# Patient Record
Sex: MALE | Race: BLACK OR AFRICAN AMERICAN | Employment: UNEMPLOYED | URBAN - METROPOLITAN AREA
[De-identification: names, ages, dates, MRNs, and addresses within clinical notes are randomized per-mention and may not be internally consistent; named-entity substitution may affect disease eponyms.]

---

## 2019-12-14 ENCOUNTER — APPOINTMENT (OUTPATIENT)
Dept: GENERAL RADIOLOGY | Facility: HOSPITAL | Age: 44
End: 2019-12-14
Attending: EMERGENCY MEDICINE
Payer: MEDICAID

## 2019-12-14 ENCOUNTER — HOSPITAL ENCOUNTER (EMERGENCY)
Facility: HOSPITAL | Age: 44
Discharge: HOME | End: 2019-12-14
Attending: EMERGENCY MEDICINE
Payer: MEDICAID

## 2019-12-14 VITALS
SYSTOLIC BLOOD PRESSURE: 141 MMHG | RESPIRATION RATE: 16 BRPM | HEART RATE: 75 BPM | OXYGEN SATURATION: 96 % | DIASTOLIC BLOOD PRESSURE: 85 MMHG | WEIGHT: 175 LBS | TEMPERATURE: 98 F

## 2019-12-14 DIAGNOSIS — R07.89 CHEST TIGHTNESS: ICD-10-CM

## 2019-12-14 DIAGNOSIS — S39.012A BACK STRAIN, INITIAL ENCOUNTER: Primary | ICD-10-CM

## 2019-12-14 PROCEDURE — 85025 COMPLETE CBC W/AUTO DIFF WBC: CPT | Performed by: EMERGENCY MEDICINE

## 2019-12-14 PROCEDURE — 80048 BASIC METABOLIC PNL TOTAL CA: CPT

## 2019-12-14 PROCEDURE — 71045 X-RAY EXAM CHEST 1 VIEW: CPT | Performed by: EMERGENCY MEDICINE

## 2019-12-14 PROCEDURE — 81003 URINALYSIS AUTO W/O SCOPE: CPT | Performed by: EMERGENCY MEDICINE

## 2019-12-14 PROCEDURE — 99284 EMERGENCY DEPT VISIT MOD MDM: CPT

## 2019-12-14 PROCEDURE — 96374 THER/PROPH/DIAG INJ IV PUSH: CPT

## 2019-12-14 PROCEDURE — 93010 ELECTROCARDIOGRAM REPORT: CPT | Performed by: EMERGENCY MEDICINE

## 2019-12-14 PROCEDURE — 81003 URINALYSIS AUTO W/O SCOPE: CPT

## 2019-12-14 PROCEDURE — 93005 ELECTROCARDIOGRAM TRACING: CPT

## 2019-12-14 PROCEDURE — 80048 BASIC METABOLIC PNL TOTAL CA: CPT | Performed by: EMERGENCY MEDICINE

## 2019-12-14 PROCEDURE — 85025 COMPLETE CBC W/AUTO DIFF WBC: CPT

## 2019-12-14 RX ORDER — KETOROLAC TROMETHAMINE 30 MG/ML
30 INJECTION, SOLUTION INTRAMUSCULAR; INTRAVENOUS ONCE
Status: COMPLETED | OUTPATIENT
Start: 2019-12-14 | End: 2019-12-14

## 2019-12-14 RX ORDER — ENALAPRIL MALEATE 10 MG/1
10 TABLET ORAL DAILY
COMMUNITY

## 2019-12-14 NOTE — ED INITIAL ASSESSMENT (HPI)
Pt c/o right flank pain since last night. Denies injury, denies n/v. He states that pain is worse with movement and taking deep breaths. Pt adds that he is also having CP.

## 2019-12-15 NOTE — ED PROVIDER NOTES
Patient Seen in: [facility] AND [facility] Emergency Department    History   Patient presents with:  Abdomen/Flank Pain  Chest Pain Angina      HPI    Patient presents to the ED complaining of pain to his right lower back that started several days ago but worsen Pulmonary/Chest: Effort normal. No stridor. No respiratory distress. He exhibits tenderness. Mild bilateral sternal tenderness, palpation reproduces symptoms. Abdominal: Soft. He exhibits no distension.  Musculoskeletal:         General: Tenderness pr Medications   ketorolac tromethamine (TORADOL) 30 MG/ML injection 30 mg (30 mg Intravenous Given 12/14/19 1949)         MDM      12/14/19  1741 12/14/19  1845 12/14/19 2034   BP: 136/65 132/76 141/85   Pulse: 83 80 75   Resp: 22 20 16   Temp: 98 °F (36.

## 2019-12-15 NOTE — ED NOTES
Pt having right flank pain. Denies dysuria. Denies history of kidney stones. States last night he had a hard time breathing. Lungs clear.

## 2019-12-15 NOTE — ED NOTES
Assumed care. Currently denies flank pain or sob, but states pain waxes and wanes. Updated on POC. Denies needs at this time.

## 2022-11-24 ENCOUNTER — HOSPITAL ENCOUNTER (EMERGENCY)
Facility: HOSPITAL | Age: 47
Discharge: HOME | End: 2022-11-24
Attending: EMERGENCY MEDICINE
Payer: MEDICAID

## 2022-11-24 ENCOUNTER — APPOINTMENT (OUTPATIENT)
Dept: GENERAL RADIOLOGY | Facility: HOSPITAL | Age: 47
End: 2022-11-24
Attending: EMERGENCY MEDICINE
Payer: MEDICAID

## 2022-11-24 VITALS
HEART RATE: 91 BPM | WEIGHT: 192 LBS | SYSTOLIC BLOOD PRESSURE: 154 MMHG | DIASTOLIC BLOOD PRESSURE: 88 MMHG | BODY MASS INDEX: 32.78 KG/M2 | OXYGEN SATURATION: 98 % | RESPIRATION RATE: 17 BRPM | TEMPERATURE: 98 F | HEIGHT: 64 IN

## 2022-11-24 DIAGNOSIS — N48.5 PENILE ULCER: ICD-10-CM
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
## 2022-11-24 DIAGNOSIS — S79.922A INJURY OF LEFT THIGH, INITIAL ENCOUNTER: ICD-10-CM

## 2022-11-24 PROCEDURE — 87491 CHLMYD TRACH DNA AMP PROBE: CPT | Performed by: EMERGENCY MEDICINE

## 2022-11-24 PROCEDURE — 87591 N.GONORRHOEAE DNA AMP PROB: CPT | Performed by: EMERGENCY MEDICINE

## 2022-11-24 PROCEDURE — 99284 EMERGENCY DEPT VISIT MOD MDM: CPT

## 2022-11-24 PROCEDURE — 96372 THER/PROPH/DIAG INJ SC/IM: CPT

## 2022-11-24 PROCEDURE — 90471 IMMUNIZATION ADMIN: CPT

## 2022-11-24 PROCEDURE — 73552 X-RAY EXAM OF FEMUR 2/>: CPT | Performed by: EMERGENCY MEDICINE

## 2022-11-24 RX ORDER — CYCLOBENZAPRINE HCL 10 MG
10 TABLET ORAL 3 TIMES DAILY PRN
Qty: 20 TABLET | Refills: 0 | Status: SHIPPED | OUTPATIENT
Start: 2022-11-24 | End: 2022-12-01

## 2022-11-24 RX ORDER — ACYCLOVIR 800 MG/1
400 TABLET ORAL 3 TIMES DAILY
Qty: 15 TABLET | Refills: 0 | Status: SHIPPED | OUTPATIENT
Start: 2022-11-24 | End: 2022-12-04

## 2022-11-24 RX ORDER — CYCLOBENZAPRINE HCL 10 MG
10 TABLET ORAL ONCE
Status: COMPLETED | OUTPATIENT
Start: 2022-11-24 | End: 2022-11-24

## 2022-11-24 RX ORDER — ACYCLOVIR 400 MG/1
400 TABLET ORAL ONCE
Status: COMPLETED | OUTPATIENT
Start: 2022-11-24 | End: 2022-11-24

## 2022-11-24 RX ORDER — AZITHROMYCIN 250 MG/1
1000 TABLET, FILM COATED ORAL ONCE
Status: COMPLETED | OUTPATIENT
Start: 2022-11-24 | End: 2022-11-24
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
## 2022-11-25 LAB
C TRACH DNA SPEC QL NAA+PROBE: NEGATIVE
N GONORRHOEA DNA SPEC QL NAA+PROBE: NEGATIVE

## 2023-03-15 ENCOUNTER — APPOINTMENT (OUTPATIENT)
Dept: GENERAL RADIOLOGY | Facility: HOSPITAL | Age: 48
End: 2023-03-15
Payer: MEDICAID

## 2023-03-15 ENCOUNTER — HOSPITAL ENCOUNTER (EMERGENCY)
Facility: HOSPITAL | Age: 48
Discharge: HOME | End: 2023-03-16
Attending: EMERGENCY MEDICINE
Payer: MEDICAID

## 2023-03-15 VITALS
TEMPERATURE: 99 F | RESPIRATION RATE: 18 BRPM | DIASTOLIC BLOOD PRESSURE: 98 MMHG | SYSTOLIC BLOOD PRESSURE: 163 MMHG | HEART RATE: 94 BPM | OXYGEN SATURATION: 94 %

## 2023-03-15 DIAGNOSIS — R05.2 SUBACUTE COUGH: Primary | ICD-10-CM

## 2023-03-15 LAB
ANION GAP SERPL CALC-SCNC: 11 MMOL/L (ref 0–18)
BASOPHILS # BLD AUTO: 0.02 X10(3) UL (ref 0–0.2)
BASOPHILS NFR BLD AUTO: 0.3 %
BUN BLD-MCNC: 16 MG/DL (ref 7–18)
BUN/CREAT SERPL: 6.2 (ref 10–20)
CALCIUM BLD-MCNC: 8.7 MG/DL (ref 8.5–10.1)
CHLORIDE SERPL-SCNC: 107 MMOL/L (ref 98–112)
CO2 SERPL-SCNC: 25 MMOL/L (ref 21–32)
CREAT BLD-MCNC: 2.57 MG/DL
DEPRECATED RDW RBC AUTO: 40.6 FL (ref 35.1–46.3)
EOSINOPHIL # BLD AUTO: 0.05 X10(3) UL (ref 0–0.7)
EOSINOPHIL NFR BLD AUTO: 0.8 %
ERYTHROCYTE [DISTWIDTH] IN BLOOD BY AUTOMATED COUNT: 11.3 % (ref 11–15)
FLUAV + FLUBV RNA SPEC NAA+PROBE: NEGATIVE
FLUAV + FLUBV RNA SPEC NAA+PROBE: NEGATIVE
GFR SERPLBLD BASED ON 1.73 SQ M-ARVRAT: 30 ML/MIN/1.73M2 (ref 60–?)
GLUCOSE BLD-MCNC: 102 MG/DL (ref 70–99)
HCT VFR BLD AUTO: 34.6 %
HGB BLD-MCNC: 12.5 G/DL
IMM GRANULOCYTES # BLD AUTO: 0.01 X10(3) UL (ref 0–1)
IMM GRANULOCYTES NFR BLD: 0.2 %
LYMPHOCYTES # BLD AUTO: 1.28 X10(3) UL (ref 1–4)
LYMPHOCYTES NFR BLD AUTO: 21.1 %
MCH RBC QN AUTO: 35.2 PG (ref 26–34)
MCHC RBC AUTO-ENTMCNC: 36.1 G/DL (ref 31–37)
MCV RBC AUTO: 97.5 FL
MONOCYTES # BLD AUTO: 0.46 X10(3) UL (ref 0.1–1)
MONOCYTES NFR BLD AUTO: 7.6 %
NEUTROPHILS # BLD AUTO: 4.26 X10 (3) UL (ref 1.5–7.7)
NEUTROPHILS # BLD AUTO: 4.26 X10(3) UL (ref 1.5–7.7)
NEUTROPHILS NFR BLD AUTO: 70 %
OSMOLALITY SERPL CALC.SUM OF ELEC: 297 MOSM/KG (ref 275–295)
PLATELET # BLD AUTO: 219 10(3)UL (ref 150–450)
POTASSIUM SERPL-SCNC: 3 MMOL/L (ref 3.5–5.1)
RBC # BLD AUTO: 3.55 X10(6)UL
RSV RNA SPEC NAA+PROBE: NEGATIVE
SARS-COV-2 RNA RESP QL NAA+PROBE: NOT DETECTED
SODIUM SERPL-SCNC: 143 MMOL/L (ref 136–145)
TROPONIN I HIGH SENSITIVITY: 10 NG/L
WBC # BLD AUTO: 6.1 X10(3) UL (ref 4–11)

## 2023-03-15 PROCEDURE — 85025 COMPLETE CBC W/AUTO DIFF WBC: CPT | Performed by: EMERGENCY MEDICINE

## 2023-03-15 PROCEDURE — 36415 COLL VENOUS BLD VENIPUNCTURE: CPT

## 2023-03-15 PROCEDURE — 80048 BASIC METABOLIC PNL TOTAL CA: CPT | Performed by: EMERGENCY MEDICINE

## 2023-03-15 PROCEDURE — 71045 X-RAY EXAM CHEST 1 VIEW: CPT

## 2023-03-15 PROCEDURE — 99285 EMERGENCY DEPT VISIT HI MDM: CPT

## 2023-03-15 PROCEDURE — 93010 ELECTROCARDIOGRAM REPORT: CPT

## 2023-03-15 PROCEDURE — 80048 BASIC METABOLIC PNL TOTAL CA: CPT

## 2023-03-15 PROCEDURE — 85025 COMPLETE CBC W/AUTO DIFF WBC: CPT

## 2023-03-15 PROCEDURE — 0241U SARS-COV-2/FLU A AND B/RSV BY PCR (GENEXPERT): CPT

## 2023-03-15 PROCEDURE — 93005 ELECTROCARDIOGRAM TRACING: CPT

## 2023-03-15 PROCEDURE — 0241U SARS-COV-2/FLU A AND B/RSV BY PCR (GENEXPERT): CPT | Performed by: EMERGENCY MEDICINE

## 2023-03-15 PROCEDURE — 99284 EMERGENCY DEPT VISIT MOD MDM: CPT

## 2023-03-15 PROCEDURE — 84484 ASSAY OF TROPONIN QUANT: CPT | Performed by: EMERGENCY MEDICINE

## 2023-03-15 RX ORDER — POTASSIUM CHLORIDE 20 MEQ/1
40 TABLET, EXTENDED RELEASE ORAL ONCE
Status: COMPLETED | OUTPATIENT
Start: 2023-03-15 | End: 2023-03-15

## 2023-03-16 LAB
ATRIAL RATE: 108 BPM
P-R INTERVAL: 150 MS
Q-T INTERVAL: 358 MS
QRS DURATION: 90 MS
QTC CALCULATION (BEZET): 479 MS
R AXIS: 185 DEGREES
T AXIS: 180 DEGREES
VENTRICULAR RATE: 108 BPM

## 2023-03-16 NOTE — ED QUICK NOTES
Pt ambulatory to nurses station stating he wants to go home. Pt denied IV access. Provider aware and walked out of department. Pt was a+ox4 with steady gait. Pt advised to wait to discharge.

## 2024-01-14 ENCOUNTER — APPOINTMENT (OUTPATIENT)
Dept: CT IMAGING | Facility: HOSPITAL | Age: 49
End: 2024-01-14
Payer: MEDICAID

## 2024-01-14 ENCOUNTER — HOSPITAL ENCOUNTER (INPATIENT)
Facility: HOSPITAL | Age: 49
LOS: 2 days | Discharge: HOME | End: 2024-01-16
Attending: EMERGENCY MEDICINE | Admitting: INTERNAL MEDICINE
Payer: MEDICAID

## 2024-01-14 ENCOUNTER — APPOINTMENT (OUTPATIENT)
Dept: GENERAL RADIOLOGY | Facility: HOSPITAL | Age: 49
End: 2024-01-14
Payer: MEDICAID

## 2024-01-14 ENCOUNTER — APPOINTMENT (OUTPATIENT)
Dept: CT IMAGING | Facility: HOSPITAL | Age: 49
End: 2024-01-14
Attending: EMERGENCY MEDICINE
Payer: MEDICAID

## 2024-01-14 DIAGNOSIS — R79.89 ELEVATED TROPONIN: ICD-10-CM

## 2024-01-14 DIAGNOSIS — N17.9 AKI (ACUTE KIDNEY INJURY) (HCC): ICD-10-CM

## 2024-01-14 DIAGNOSIS — R74.01 TRANSAMINITIS: ICD-10-CM

## 2024-01-14 DIAGNOSIS — I63.9 CEREBROVASCULAR ACCIDENT (CVA), UNSPECIFIED MECHANISM (HCC): Primary | ICD-10-CM

## 2024-01-14 PROBLEM — R29.898 RIGHT ARM WEAKNESS: Status: ACTIVE | Noted: 2024-01-14

## 2024-01-14 PROBLEM — R20.0 RIGHT ARM NUMBNESS: Status: ACTIVE | Noted: 2024-01-14

## 2024-01-14 LAB
ALBUMIN SERPL-MCNC: 4.3 G/DL (ref 3.2–4.8)
ALBUMIN/GLOB SERPL: 1.7 {RATIO} (ref 1–2)
ALP LIVER SERPL-CCNC: 56 U/L
ALT SERPL-CCNC: 72 U/L
ANION GAP SERPL CALC-SCNC: 9 MMOL/L (ref 0–18)
ANTIBODY SCREEN: NEGATIVE
APTT PPP: 25.4 SECONDS (ref 23.3–35.6)
AST SERPL-CCNC: 201 U/L (ref ?–34)
BASOPHILS # BLD AUTO: 0.03 X10(3) UL (ref 0–0.2)
BASOPHILS NFR BLD AUTO: 0.1 %
BILIRUB SERPL-MCNC: 0.7 MG/DL (ref 0.3–1.2)
BUN BLD-MCNC: 13 MG/DL (ref 9–23)
BUN/CREAT SERPL: 8.1 (ref 10–20)
CALCIUM BLD-MCNC: 8.7 MG/DL (ref 8.7–10.4)
CHLORIDE SERPL-SCNC: 109 MMOL/L (ref 98–112)
CHOLEST SERPL-MCNC: 154 MG/DL (ref ?–200)
CO2 SERPL-SCNC: 25 MMOL/L (ref 21–32)
CREAT BLD-MCNC: 1.61 MG/DL
DEPRECATED RDW RBC AUTO: 44.4 FL (ref 35.1–46.3)
EGFRCR SERPLBLD CKD-EPI 2021: 52 ML/MIN/1.73M2 (ref 60–?)
EOSINOPHIL # BLD AUTO: 0 X10(3) UL (ref 0–0.7)
EOSINOPHIL NFR BLD AUTO: 0 %
ERYTHROCYTE [DISTWIDTH] IN BLOOD BY AUTOMATED COUNT: 12.2 % (ref 11–15)
FLUAV + FLUBV RNA SPEC NAA+PROBE: NEGATIVE
FLUAV + FLUBV RNA SPEC NAA+PROBE: NEGATIVE
GLOBULIN PLAS-MCNC: 2.6 G/DL (ref 2.8–4.4)
GLUCOSE BLD-MCNC: 94 MG/DL (ref 70–99)
GLUCOSE BLDC GLUCOMTR-MCNC: 94 MG/DL (ref 70–99)
GLUCOSE BLDC GLUCOMTR-MCNC: 95 MG/DL (ref 70–99)
HCT VFR BLD AUTO: 38.3 %
HDLC SERPL-MCNC: 66 MG/DL (ref 40–59)
HGB BLD-MCNC: 13.3 G/DL
IMM GRANULOCYTES # BLD AUTO: 0.12 X10(3) UL (ref 0–1)
IMM GRANULOCYTES NFR BLD: 0.5 %
INR BLD: 1.14 (ref 0.8–1.2)
LDLC SERPL CALC-MCNC: 72 MG/DL (ref ?–100)
LYMPHOCYTES # BLD AUTO: 1.03 X10(3) UL (ref 1–4)
LYMPHOCYTES NFR BLD AUTO: 4.5 %
MCH RBC QN AUTO: 34.5 PG (ref 26–34)
MCHC RBC AUTO-ENTMCNC: 34.7 G/DL (ref 31–37)
MCV RBC AUTO: 99.5 FL
MONOCYTES # BLD AUTO: 1.58 X10(3) UL (ref 0.1–1)
MONOCYTES NFR BLD AUTO: 6.9 %
NEUTROPHILS # BLD AUTO: 20.05 X10 (3) UL (ref 1.5–7.7)
NEUTROPHILS # BLD AUTO: 20.05 X10(3) UL (ref 1.5–7.7)
NEUTROPHILS NFR BLD AUTO: 88 %
NONHDLC SERPL-MCNC: 88 MG/DL (ref ?–130)
OSMOLALITY SERPL CALC.SUM OF ELEC: 296 MOSM/KG (ref 275–295)
PLATELET # BLD AUTO: 222 10(3)UL (ref 150–450)
POTASSIUM SERPL-SCNC: 3.7 MMOL/L (ref 3.5–5.1)
PROT SERPL-MCNC: 6.9 G/DL (ref 5.7–8.2)
PROTHROMBIN TIME: 15.3 SECONDS (ref 11.6–14.8)
RBC # BLD AUTO: 3.85 X10(6)UL
RH BLOOD TYPE: POSITIVE
RSV RNA SPEC NAA+PROBE: NEGATIVE
SARS-COV-2 RNA RESP QL NAA+PROBE: NOT DETECTED
SODIUM SERPL-SCNC: 143 MMOL/L (ref 136–145)
TRIGL SERPL-MCNC: 85 MG/DL (ref 30–149)
TROPONIN I SERPL HS-MCNC: 229 NG/L
TROPONIN I SERPL HS-MCNC: 403 NG/L
VLDLC SERPL CALC-MCNC: 13 MG/DL (ref 0–30)
WBC # BLD AUTO: 22.8 X10(3) UL (ref 4–11)

## 2024-01-14 PROCEDURE — 71275 CT ANGIOGRAPHY CHEST: CPT | Performed by: EMERGENCY MEDICINE

## 2024-01-14 PROCEDURE — 71045 X-RAY EXAM CHEST 1 VIEW: CPT | Performed by: EMERGENCY MEDICINE

## 2024-01-14 PROCEDURE — 70496 CT ANGIOGRAPHY HEAD: CPT | Performed by: EMERGENCY MEDICINE

## 2024-01-14 PROCEDURE — 70450 CT HEAD/BRAIN W/O DYE: CPT

## 2024-01-14 PROCEDURE — 70498 CT ANGIOGRAPHY NECK: CPT | Performed by: EMERGENCY MEDICINE

## 2024-01-14 PROCEDURE — 99291 CRITICAL CARE FIRST HOUR: CPT | Performed by: OTHER

## 2024-01-14 PROCEDURE — 74175 CTA ABDOMEN W/CONTRAST: CPT | Performed by: EMERGENCY MEDICINE

## 2024-01-14 RX ORDER — ASPIRIN 325 MG
325 TABLET ORAL DAILY
Status: DISCONTINUED | OUTPATIENT
Start: 2024-01-15 | End: 2024-01-16

## 2024-01-14 RX ORDER — MELATONIN
3 NIGHTLY PRN
Status: DISCONTINUED | OUTPATIENT
Start: 2024-01-14 | End: 2024-01-16

## 2024-01-14 RX ORDER — ACETAMINOPHEN 650 MG/1
650 SUPPOSITORY RECTAL EVERY 4 HOURS PRN
Status: DISCONTINUED | OUTPATIENT
Start: 2024-01-14 | End: 2024-01-14

## 2024-01-14 RX ORDER — HEPARIN SODIUM 1000 [USP'U]/ML
60 INJECTION, SOLUTION INTRAVENOUS; SUBCUTANEOUS ONCE
Status: DISCONTINUED | OUTPATIENT
Start: 2024-01-14 | End: 2024-01-14

## 2024-01-14 RX ORDER — FOLIC ACID 1 MG/1
1 TABLET ORAL DAILY
COMMUNITY

## 2024-01-14 RX ORDER — HEPARIN SODIUM 5000 [USP'U]/ML
5000 INJECTION, SOLUTION INTRAVENOUS; SUBCUTANEOUS EVERY 8 HOURS SCHEDULED
Status: DISCONTINUED | OUTPATIENT
Start: 2024-01-14 | End: 2024-01-14

## 2024-01-14 RX ORDER — ONDANSETRON 2 MG/ML
4 INJECTION INTRAMUSCULAR; INTRAVENOUS EVERY 6 HOURS PRN
Status: DISCONTINUED | OUTPATIENT
Start: 2024-01-14 | End: 2024-01-16

## 2024-01-14 RX ORDER — BISACODYL 10 MG
10 SUPPOSITORY, RECTAL RECTAL
Status: DISCONTINUED | OUTPATIENT
Start: 2024-01-14 | End: 2024-01-16

## 2024-01-14 RX ORDER — POLYETHYLENE GLYCOL 3350 17 G/17G
17 POWDER, FOR SOLUTION ORAL DAILY PRN
Status: DISCONTINUED | OUTPATIENT
Start: 2024-01-14 | End: 2024-01-16

## 2024-01-14 RX ORDER — ACETAMINOPHEN 650 MG/1
650 SUPPOSITORY RECTAL EVERY 4 HOURS PRN
Status: DISCONTINUED | OUTPATIENT
Start: 2024-01-14 | End: 2024-01-16

## 2024-01-14 RX ORDER — HEPARIN SODIUM AND DEXTROSE 10000; 5 [USP'U]/100ML; G/100ML
12 INJECTION INTRAVENOUS ONCE
Status: DISCONTINUED | OUTPATIENT
Start: 2024-01-14 | End: 2024-01-14

## 2024-01-14 RX ORDER — METHYLPREDNISOLONE SODIUM SUCCINATE 125 MG/2ML
125 INJECTION, POWDER, LYOPHILIZED, FOR SOLUTION INTRAMUSCULAR; INTRAVENOUS ONCE AS NEEDED
Status: DISCONTINUED | OUTPATIENT
Start: 2024-01-14 | End: 2024-01-14

## 2024-01-14 RX ORDER — POTASSIUM CHLORIDE 20 MEQ/1
40 TABLET, EXTENDED RELEASE ORAL ONCE
Status: COMPLETED | OUTPATIENT
Start: 2024-01-14 | End: 2024-01-14

## 2024-01-14 RX ORDER — SODIUM CHLORIDE 9 MG/ML
INJECTION, SOLUTION INTRAVENOUS CONTINUOUS
Status: DISCONTINUED | OUTPATIENT
Start: 2024-01-14 | End: 2024-01-16

## 2024-01-14 RX ORDER — ONDANSETRON 2 MG/ML
4 INJECTION INTRAMUSCULAR; INTRAVENOUS EVERY 6 HOURS PRN
Status: DISCONTINUED | OUTPATIENT
Start: 2024-01-14 | End: 2024-01-14

## 2024-01-14 RX ORDER — PROCHLORPERAZINE EDISYLATE 5 MG/ML
5 INJECTION INTRAMUSCULAR; INTRAVENOUS EVERY 8 HOURS PRN
Status: DISCONTINUED | OUTPATIENT
Start: 2024-01-14 | End: 2024-01-16

## 2024-01-14 RX ORDER — ASPIRIN 325 MG
325 TABLET, DELAYED RELEASE (ENTERIC COATED) ORAL DAILY
Status: DISCONTINUED | OUTPATIENT
Start: 2024-01-14 | End: 2024-01-14

## 2024-01-14 RX ORDER — ACETAMINOPHEN 325 MG/1
650 TABLET ORAL EVERY 4 HOURS PRN
Status: DISCONTINUED | OUTPATIENT
Start: 2024-01-14 | End: 2024-01-16

## 2024-01-14 RX ORDER — ENEMA 19; 7 G/133ML; G/133ML
1 ENEMA RECTAL ONCE AS NEEDED
Status: DISCONTINUED | OUTPATIENT
Start: 2024-01-14 | End: 2024-01-16

## 2024-01-14 RX ORDER — FAMOTIDINE 10 MG/ML
20 INJECTION, SOLUTION INTRAVENOUS ONCE AS NEEDED
Status: DISCONTINUED | OUTPATIENT
Start: 2024-01-14 | End: 2024-01-14

## 2024-01-14 RX ORDER — LABETALOL HYDROCHLORIDE 5 MG/ML
10 INJECTION, SOLUTION INTRAVENOUS EVERY 10 MIN PRN
Status: DISCONTINUED | OUTPATIENT
Start: 2024-01-14 | End: 2024-01-14

## 2024-01-14 RX ORDER — LABETALOL HYDROCHLORIDE 5 MG/ML
10 INJECTION, SOLUTION INTRAVENOUS EVERY 10 MIN PRN
Status: DISCONTINUED | OUTPATIENT
Start: 2024-01-14 | End: 2024-01-16

## 2024-01-14 RX ORDER — PROCHLORPERAZINE EDISYLATE 5 MG/ML
5 INJECTION INTRAMUSCULAR; INTRAVENOUS EVERY 8 HOURS PRN
Status: DISCONTINUED | OUTPATIENT
Start: 2024-01-14 | End: 2024-01-14

## 2024-01-14 RX ORDER — SENNOSIDES 8.6 MG
17.2 TABLET ORAL NIGHTLY PRN
Status: DISCONTINUED | OUTPATIENT
Start: 2024-01-14 | End: 2024-01-16

## 2024-01-14 RX ORDER — SIMETHICONE 80 MG
80 TABLET,CHEWABLE ORAL
Status: DISCONTINUED | OUTPATIENT
Start: 2024-01-14 | End: 2024-01-16

## 2024-01-14 RX ORDER — HEPARIN SODIUM AND DEXTROSE 10000; 5 [USP'U]/100ML; G/100ML
INJECTION INTRAVENOUS CONTINUOUS
Status: DISCONTINUED | OUTPATIENT
Start: 2024-01-15 | End: 2024-01-14

## 2024-01-14 RX ORDER — ATORVASTATIN CALCIUM 80 MG/1
80 TABLET, FILM COATED ORAL NIGHTLY
Status: DISCONTINUED | OUTPATIENT
Start: 2024-01-14 | End: 2024-01-16

## 2024-01-14 RX ORDER — SODIUM CHLORIDE 9 MG/ML
INJECTION, SOLUTION INTRAVENOUS CONTINUOUS
Status: DISCONTINUED | OUTPATIENT
Start: 2024-01-14 | End: 2024-01-15

## 2024-01-14 RX ORDER — ASPIRIN 300 MG/1
300 SUPPOSITORY RECTAL DAILY
Status: DISCONTINUED | OUTPATIENT
Start: 2024-01-15 | End: 2024-01-16

## 2024-01-14 RX ORDER — SODIUM CHLORIDE 9 MG/ML
INJECTION, SOLUTION INTRAVENOUS CONTINUOUS
Status: DISCONTINUED | OUTPATIENT
Start: 2024-01-14 | End: 2024-01-14

## 2024-01-14 RX ORDER — CLOPIDOGREL BISULFATE 75 MG/1
300 TABLET ORAL ONCE
Status: COMPLETED | OUTPATIENT
Start: 2024-01-14 | End: 2024-01-14

## 2024-01-14 RX ORDER — DIPHENHYDRAMINE HYDROCHLORIDE 50 MG/ML
50 INJECTION INTRAMUSCULAR; INTRAVENOUS ONCE AS NEEDED
Status: DISCONTINUED | OUTPATIENT
Start: 2024-01-14 | End: 2024-01-14

## 2024-01-14 RX ORDER — HYDRALAZINE HYDROCHLORIDE 20 MG/ML
10 INJECTION INTRAMUSCULAR; INTRAVENOUS EVERY 2 HOUR PRN
Status: DISCONTINUED | OUTPATIENT
Start: 2024-01-14 | End: 2024-01-16

## 2024-01-14 NOTE — ED INITIAL ASSESSMENT (HPI)
Donell arrives with complaints of right hand numbness after waking from his nap around 3pm,. Numbness is consistent. No weakness noted.   Patient also stated he had an unsteady gait after his nap.
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
## 2024-01-15 ENCOUNTER — APPOINTMENT (OUTPATIENT)
Dept: MRI IMAGING | Facility: HOSPITAL | Age: 49
End: 2024-01-15
Attending: Other
Payer: MEDICAID

## 2024-01-15 ENCOUNTER — APPOINTMENT (OUTPATIENT)
Dept: CT IMAGING | Facility: HOSPITAL | Age: 49
End: 2024-01-15
Attending: Other
Payer: MEDICAID

## 2024-01-15 ENCOUNTER — APPOINTMENT (OUTPATIENT)
Dept: GENERAL RADIOLOGY | Facility: HOSPITAL | Age: 49
End: 2024-01-15
Attending: INTERNAL MEDICINE
Payer: MEDICAID

## 2024-01-15 ENCOUNTER — APPOINTMENT (OUTPATIENT)
Dept: CV DIAGNOSTICS | Facility: HOSPITAL | Age: 49
End: 2024-01-15
Attending: INTERNAL MEDICINE
Payer: MEDICAID

## 2024-01-15 LAB
ALBUMIN SERPL-MCNC: 3.9 G/DL (ref 3.2–4.8)
ALBUMIN/GLOB SERPL: 1.8 {RATIO} (ref 1–2)
ALP LIVER SERPL-CCNC: 39 U/L
ALT SERPL-CCNC: 121 U/L
ANION GAP SERPL CALC-SCNC: 7 MMOL/L (ref 0–18)
AST SERPL-CCNC: 292 U/L (ref ?–34)
ATRIAL RATE: 113 BPM
ATRIAL RATE: 115 BPM
BILIRUB SERPL-MCNC: 1 MG/DL (ref 0.3–1.2)
BILIRUB UR QL: NEGATIVE
BUN BLD-MCNC: 16 MG/DL (ref 9–23)
BUN/CREAT SERPL: 12.9 (ref 10–20)
C DIFF GDH + TOXINS A+B STL QL IA.RAPID: NOT DETECTED
C DIFF TOX B STL QL: POSITIVE
CALCIUM BLD-MCNC: 8.4 MG/DL (ref 8.7–10.4)
CHLORIDE SERPL-SCNC: 106 MMOL/L (ref 98–112)
CLARITY UR: CLEAR
CO2 SERPL-SCNC: 25 MMOL/L (ref 21–32)
CREAT BLD-MCNC: 1.24 MG/DL
DEPRECATED RDW RBC AUTO: 42.8 FL (ref 35.1–46.3)
EGFRCR SERPLBLD CKD-EPI 2021: 72 ML/MIN/1.73M2 (ref 60–?)
ERYTHROCYTE [DISTWIDTH] IN BLOOD BY AUTOMATED COUNT: 12.3 % (ref 11–15)
EST. AVERAGE GLUCOSE BLD GHB EST-MCNC: 82 MG/DL (ref 68–126)
GLOBULIN PLAS-MCNC: 2.2 G/DL (ref 2.8–4.4)
GLUCOSE BLD-MCNC: 96 MG/DL (ref 70–99)
GLUCOSE BLDC GLUCOMTR-MCNC: 104 MG/DL (ref 70–99)
GLUCOSE BLDC GLUCOMTR-MCNC: 106 MG/DL (ref 70–99)
GLUCOSE BLDC GLUCOMTR-MCNC: 118 MG/DL (ref 70–99)
GLUCOSE UR-MCNC: NORMAL MG/DL
HBA1C MFR BLD: 4.5 % (ref ?–5.7)
HCT VFR BLD AUTO: 33.9 %
HGB BLD-MCNC: 12.5 G/DL
KETONES UR-MCNC: NEGATIVE MG/DL
LEUKOCYTE ESTERASE UR QL STRIP.AUTO: NEGATIVE
MCH RBC QN AUTO: 35.5 PG (ref 26–34)
MCHC RBC AUTO-ENTMCNC: 36.9 G/DL (ref 31–37)
MCV RBC AUTO: 96.3 FL
NITRITE UR QL STRIP.AUTO: NEGATIVE
OSMOLALITY SERPL CALC.SUM OF ELEC: 287 MOSM/KG (ref 275–295)
P AXIS: 59 DEGREES
P AXIS: 69 DEGREES
P-R INTERVAL: 116 MS
P-R INTERVAL: 148 MS
PH UR: 5 [PH] (ref 5–8)
PLATELET # BLD AUTO: 157 10(3)UL (ref 150–450)
POTASSIUM SERPL-SCNC: 4.4 MMOL/L (ref 3.5–5.1)
POTASSIUM SERPL-SCNC: 4.4 MMOL/L (ref 3.5–5.1)
PROT SERPL-MCNC: 6.1 G/DL (ref 5.7–8.2)
PROT UR-MCNC: NEGATIVE MG/DL
Q-T INTERVAL: 338 MS
Q-T INTERVAL: 344 MS
QRS DURATION: 82 MS
QRS DURATION: 88 MS
QTC CALCULATION (BEZET): 467 MS
QTC CALCULATION (BEZET): 471 MS
R AXIS: -7 DEGREES
R AXIS: -7 DEGREES
RBC # BLD AUTO: 3.52 X10(6)UL
RH BLOOD TYPE: POSITIVE
SODIUM SERPL-SCNC: 138 MMOL/L (ref 136–145)
SP GR UR STRIP: >1.03 (ref 1–1.03)
T AXIS: -1 DEGREES
T AXIS: 23 DEGREES
TROPONIN I SERPL HS-MCNC: 259 NG/L
UROBILINOGEN UR STRIP-ACNC: NORMAL
VENTRICULAR RATE: 113 BPM
VENTRICULAR RATE: 115 BPM
WBC # BLD AUTO: 13.2 X10(3) UL (ref 4–11)

## 2024-01-15 PROCEDURE — 70551 MRI BRAIN STEM W/O DYE: CPT | Performed by: OTHER

## 2024-01-15 PROCEDURE — 74019 RADEX ABDOMEN 2 VIEWS: CPT | Performed by: INTERNAL MEDICINE

## 2024-01-15 PROCEDURE — 93306 TTE W/DOPPLER COMPLETE: CPT | Performed by: INTERNAL MEDICINE

## 2024-01-15 RX ORDER — AMLODIPINE BESYLATE 5 MG/1
5 TABLET ORAL DAILY
Status: DISCONTINUED | OUTPATIENT
Start: 2024-01-15 | End: 2024-01-16

## 2024-01-15 RX ORDER — HYDROCODONE BITARTRATE AND ACETAMINOPHEN 5; 325 MG/1; MG/1
1 TABLET ORAL EVERY 6 HOURS PRN
Status: DISCONTINUED | OUTPATIENT
Start: 2024-01-15 | End: 2024-01-16

## 2024-01-15 RX ORDER — FOLIC ACID 1 MG/1
1 TABLET ORAL DAILY
Status: DISCONTINUED | OUTPATIENT
Start: 2024-01-15 | End: 2024-01-16

## 2024-01-15 RX ORDER — GABAPENTIN 300 MG/1
300 CAPSULE ORAL 3 TIMES DAILY
Status: DISCONTINUED | OUTPATIENT
Start: 2024-01-15 | End: 2024-01-16

## 2024-01-15 RX ORDER — VANCOMYCIN HYDROCHLORIDE 125 MG/1
125 CAPSULE ORAL 4 TIMES DAILY
Status: DISCONTINUED | OUTPATIENT
Start: 2024-01-15 | End: 2024-01-16

## 2024-01-15 RX ORDER — PANTOPRAZOLE SODIUM 40 MG/1
40 TABLET, DELAYED RELEASE ORAL
Status: DISCONTINUED | OUTPATIENT
Start: 2024-01-15 | End: 2024-01-16

## 2024-01-15 RX ORDER — CLOPIDOGREL BISULFATE 75 MG/1
75 TABLET ORAL DAILY
Status: DISCONTINUED | OUTPATIENT
Start: 2024-01-15 | End: 2024-01-16
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
## 2024-01-15 NOTE — PHYSICAL THERAPY NOTE
PHYSICAL THERAPY EVALUATION - INPATIENT     Room Number: 313/313-A  Evaluation Date: 1/15/2024  Type of Evaluation: Initial   Physician Order: PT Eval and Treat    Presenting Problem: r/o CVA. R hand numbness/weakness     Reason for Therapy: Mobility Dysfunction and Discharge Planning    PHYSICAL THERAPY ASSESSMENT     Patient is a 48 year old male admitted 1/14/2024 for R hand numbness and weakness. MRI brain (-) acute findings.   Patient received sitting in bedside chair. RN approved activity. Therapist educated pt on POC and physiological benefits of mobilization. Patient agreeable to participate. Primary complaint is continued numbness in babin and dorsal aspect of R hand and mild numbness on L hand fingertips. UE coordination intact bilaterally with RUE delayed with finger<>nose.  Transfers: Independent   Ambulation: Independent without DME. 60ft within isolation room. Fluid and steady gait pattern with good navigation around obstacles in path.   Stair negotiation: Simulated stair negotiation in isolation room with 15 high-knee marches achieving >8\" foot clearance off floor to clear an average height step requiring Supervision and LUE on bedrail for support.     */91mmHg sitting. 171/101mmHg sitting post activity.     Patient in chair at end of session with needs in reach.     The patient's Approx Degree of Impairment: 11.2% has been calculated based on documentation in the [facility] '6 clicks' Inpatient Basic Mobility Short Form.  Research supports that patients with this level of impairment may benefit from home.    DISCHARGE RECOMMENDATIONS  PT Discharge Recommendations: Home    PLAN    Patient has been evaluated and presents with no skilled Physical Therapy needs at this time.  Patient will be discharged from Physical Therapy services. Please re-order if a new functional limitation presents during this admission.    PHYSICAL THERAPY MEDICAL/SOCIAL HISTORY     Problem List  Principal Problem:     Cerebrovascular accident (CVA), unspecified mechanism (HCC)  Active Problems:    Right arm numbness    Right arm weakness      HOME SITUATION  Home Situation  Type of Home: House  Home Layout: One level  Stairs to Enter : 15  Railing: Yes  Lives With:  (brother and sister)  Drives: Yes  Patient Owned Equipment: None  Patient Regularly Uses: Glasses     Prior Level of [place]: Ind in ADL's and IADL's. +driving.     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, I drive a truck\"     PHYSICAL THERAPY EXAMINATION     OBJECTIVE  Precautions:  (isolation: enteric/contact+)  Fall Risk: Standard fall risk    WEIGHT BEARING RESTRICTION  Weight Bearing Restriction: None                PAIN ASSESSMENT  Ratin  Location: \"front of chest and abdomen\"  Management Techniques: Activity promotion;Body mechanics;Repositioning    COGNITION  Overall Cognitive Status:  WFL - within functional limits    RANGE OF MOTION AND STRENGTH ASSESSMENT  Upper extremity ROM and strength are within functional limits   Lower extremity ROM is within functional limits   Lower extremity strength is within functional limits     BALANCE  Static Sitting: Normal  Dynamic Sitting: Normal  Static Standing: Good  Dynamic Standing: Good       NEUROLOGICAL FINDINGS  Coordination - Finger to Nose: Right decreased speed        Sensation: numbness and tingling R hand dorsum and babin surface. L hand, mild numbness in fingertips.          ACTIVITY TOLERANCE  Pulse: 85 (88bpm with activity)  Heart Rate Source: Monitor     BP: (!) 171/101  BP Location: Right arm  BP Method: Automatic  Patient Position: Sitting    O2 WALK  Oxygen Therapy  SPO2% on Room Air at Rest: 98  SPO2% Ambulation on Room Air: 96    AM-PAC '6-Clicks' INPATIENT SHORT FORM - BASIC MOBILITY  How much difficulty does the patient currently have...  Patient Difficulty: Turning over in bed (including adjusting bedclothes, sheets and blankets)?: None   Patient Difficulty: Sitting down on and standing up from a  chair with arms (e.g., wheelchair, bedside commode, etc.): None   Patient Difficulty: Moving from lying on back to sitting on the side of the bed?: None   How much help from another person does the patient currently need...   Help from Another: Moving to and from a bed to a chair (including a wheelchair)?: None   Help from Another: Need to walk in hospital room?: None   Help from Another: Climbing 3-5 steps with a railing?: A Little     AM-PAC Score:  Raw Score: 23   Approx Degree of Impairment: 11.2%   Standardized Score (AM-PAC Scale): 56.93   CMS Modifier (G-Code): CI    FUNCTIONAL ABILITY STATUS  Functional Mobility/Gait Assessment  Gait Assistance: Independent  Distance (ft): 60ft    Exercise/Education Provided:  Energy conservation  Functional activity tolerated  Gait training  Posture  Strengthening  Transfer training    Patient End of Session: Up in chair;Needs met;Call light within reach;RN aware of session/findings    Patient was able to achieve the following ...   Patient able to transfer  At previous, functional level  Safely and independently    Patient able to ambulate on level surfaces  At previous, functional level  Safely and independently     Patient Evaluation Complexity Level:  History Low - no personal factors and/or co-morbidities   Examination of body systems Low - addressing 1-2 elements   Clinical Presentation Low - Stable   Clinical Decision Making Low Complexity     Gait Trainin minutes

## 2024-01-15 NOTE — OCCUPATIONAL THERAPY NOTE
OCCUPATIONAL THERAPY EVALUATION - INPATIENT     Room Number: 313/313-A  Evaluation Date: 1/15/2024  Type of Evaluation: Quick Eval  Presenting Problem: YANG; R hand numbness/weakness, dysarthria; bloody loose stools  Hx HTN    Physician Order: IP Consult to Occupational Therapy  Reason for Therapy: ADL/IADL Dysfunction and Discharge Planning    OCCUPATIONAL THERAPY ASSESSMENT   Patient is a 48 year old male admitted 1/14/2024 for YANG; R hand numbness/weakness, dysarthria; bloody loose stools. Orders received, chart reviewed. RN approved patient participation. Patient independent at baseline and appears to be at baseline level of function for ADLs and mobility based on today's evaluation. The patient's Approx Degree of Impairment: 0% has been calculated based on documentation in the [facility] '6 clicks' Inpatient Daily Activity Short Form.  Research supports that patients with this level of impairment may benefit from discharge home without skilled OT needs. Recommend discharge home.     DISCHARGE RECOMMENDATIONS  OT Discharge Recommendations: Home    PLAN  Patient has been evaluated and presents with no skilled Occupational Therapy needs  at this time.  Patient will be discharged from Occupational Therapy services. Please re-order if a new functional limitation presents during this admission.    OCCUPATIONAL THERAPY MEDICAL/SOCIAL HISTORY   Problem List  Principal Problem:    Cerebrovascular accident (CVA), unspecified mechanism (HCC)  Active Problems:    Right arm numbness    Right arm weakness    HOME SITUATION  Type of Home: House  Home Layout: One level  Lives With: -- (brother (works))  Toilet and Equipment: Standard height toilet  Shower/Tub and Equipment: Tub-shower combo  Occupation/Status:   Drives: Yes  Patient Regularly Uses: Glasses  Stairs in Home: 15 NATALI with railing   Use of Assistive Device(s): None    Prior Level of [place]: Independent    SUBJECTIVE  \"It still feels a little numb in  my fingertips.\"    OCCUPATIONAL THERAPY EXAMINATION    OBJECTIVE  Precautions: Aspiration (-180)  Fall Risk: Standard fall risk    WEIGHT BEARING RESTRICTION  None    PAIN ASSESSMENT  Rating: 10  Location: abdomen  Management Techniques: Activity promotion; Body mechanics; Repositioning; Nurse notified    O2 SATURATIONS  Oxygen Therapy  SPO2% Ambulation on Room Air: 98    COGNITION  Overall Cognitive Status:  WFL - within functional limits    SENSATION  Endorsed B hand numbness, R > L  Improving to only fingertips now  Did not appear to impact patient's ability to brush his teeth or text on his phone    RANGE OF MOTION   Upper extremity ROM is within functional limits     STRENGTH ASSESSMENT  Upper extremity strength is within functional limits     COORDINATION  Gross Motor: WFL   Fine Motor: WFL   Opposition: symmetrical    ACTIVITIES OF DAILY LIVING ASSESSMENT  AM-PAC ‘6-Clicks’ Inpatient Daily Activity Short Form  How much help from another person does the patient currently need…  -   Putting on and taking off regular lower body clothing?: None  -   Bathing (including washing, rinsing, drying)?: None  -   Toileting, which includes using toilet, bedpan or urinal? : None  -   Putting on and taking off regular upper body clothing?: None  -   Taking care of personal grooming such as brushing teeth?: None  -   Eating meals?: None    AM-PAC Score:  Score: 24  Approx Degree of Impairment: 0%  Standardized Score (AM-PAC Scale): 57.54  CMS Modifier (G-Code): CH    BED MOBILITY  Supine<>Sit: Independent    FUNCTIONAL TRANSFER ASSESSMENT  Sit<>Stand from EOB: Independent    FUNCTIONAL MOBILITY  Independent for in-room mobility without a device, no LOB throughout.    FUNCTIONAL ADL ASSESSMENT  Eating: independent (per obs)   Grooming: independent standing at sink   UB Dressing: independent seated (per obs)   LB Dressing: independent  Toileting: independent (per obs)     EDUCATION PROVIDED  Patient : Role of  Occupational Therapy; Plan of Care; Discharge Recommendations; Fall Prevention; Energy Conservation  Patient's Response to Education: Verbalized Understanding    Patient End of Session: In bed;Needs met;RN aware of session/findings;Call light within reach;All patient questions and concerns addressed    Patient was able to achieve the following ...   Patient able to toilet transfer  safely and independently    Patient able to dress lower extremities  safely and independently    Patient/Caregiver able to demonstrate safety with ADLS  at previous functional level     Patient Evaluation Complexity Level:   Occupational Profile/Medical History LOW - Brief history including review of medical or therapy records    Specific performance deficits impacting engagement in ADL/IADL LOW  1 - 3 performance deficits    Client Assessment/Performance Deficits LOW - No comorbidities nor modifications of tasks    Clinical Decision Making LOW - Analysis of occupational profile, problem-focused assessments, limited treatment options    Overall Complexity LOW     OT Session Time  Self-Care Home Management: 15 minutes      SELIN Forman/L  [facility]  #[id number]

## 2024-01-15 NOTE — CM/SW NOTE
01/15/24 1000   CM/SW Referral Data   Referral Source Physician   Reason for Referral Protocol order set   Specify order set Stroke   Informant Patient   Medical Hx   Does patient have an established PCP? Yes  ([place])   Patient Info   Patient's Current Mental Status at Time of Assessment Alert;Oriented   Patient's Home Environment House   Number of Levels in Home 2   Number of Stair in Home 15 to enter   Patient lives with Sibling  (w/ brother & sister)   Patient Status Prior to Admission   Independent with ADLs and Mobility Yes   Discharge Needs   Anticipated D/C needs No anticipated discharge needs       10:45AM  SW received MDO for HH Evaluation per stroke w/up protocol. Per RN rounds, pt continues to ambulate independent/standby.    SW met w/ pt at bedside. Above assessment completed.    Pt confirmed living w/ his brother and sister in a home w/ 2 levels. Pt mainly stays on first level. There are approx 15 steps to enter.    Pt denies any assistance and denies using any assistive devices for ambulation.    Pt has no hx w/ HH or EARL.    There are currently no needs anticipated at [facility]. PT/OT is still pending.    02:20PM  Per chart, PT recommends Home - no needs.    PLAN: Home - pending med clear      SW/CM to remain available for support and/or discharge planning.         Kailyn Rivas, MSW, LSW [id number]

## 2024-01-15 NOTE — ED QUICK NOTES
Orders for admission, patient is aware of plan and ready to go upstairs. Any questions, please call ED RN Alexa at extension [phone].     Patient Covid vaccination status: Unvaccinated     COVID Test Ordered in ED: SARS-CoV-2/Flu A and B/RSV by PCR (GeneXpert)    COVID Suspicion at Admission: N/A    Running Infusions:    sodium chloride          Mental Status/LOC at time of transport: aox4    Other pertinent information:   CIWA score: N/A   NIH score:  2   Self care  #20g IV to left AC

## 2024-01-15 NOTE — PLAN OF CARE
Patient from home with brother and sister. Admitted for new onset numbness to R hand. Here for stroke rule out. He is alert and oriented x4. Continues to report numbness to R hand. He also reported new numbness to the fingers of his L hand. Dr. Neville notified. No other deficit noted on exam. Q2h neuro and vitals performed. Troponin elevated. Denies chest pain. Dr. Escalante notified. Trending troponin. He also reported having some bloody stool this evening. Pt flushed toilet prior to RN seeing stool. Educated patient to call before flushing stool to assess. Having some abdominal cramping. Dr. Escalante notified. Simethicone ordered. Abdominal cramping on and off throughout the night. Tylenol offered, patient declined. Dr. Gleason notified about repeat troponin for midnight as well as continues abdominal pain. He is on IVF 0.9 NS @ 75ml/hr. Fall precautions in place. Plan for MRI of the brain and echo.   This RN called MRI tech to verify when his stat MRI brain will be done. Per tech, it will be done in the morning. Dr. Neville notified about MRI time.     0647- Pt BM this morning. Maroon-josi in color. Dr. Gleason and Dr. Evangelista notified. /102. Diet order changed to NPO. Hold plavix and aspirin per dr. Evangelista.     Problem: Patient Centered Care  Goal: Patient preferences are identified and integrated in the patient's plan of care  Description: Interventions:  - What would you like us to know as we care for you? From home with brother and sister  - Provide timely, complete, and accurate information to patient/family  - Incorporate patient and family knowledge, values, beliefs, and cultural backgrounds into the planning and delivery of care  - Encourage patient/family to participate in care and decision-making at the level they choose  - Honor patient and family perspectives and choices  Outcome: Progressing     Problem: Patient/Family Goals  Goal: Patient/Family Long Term Goal  Description: Patient's Long  Term Goal: decrease stroke symptoms      Interventions:  - monitor vitals, monitor labs  - MRI  - echo   - See additional Care Plan goals for specific interventions  Outcome: Progressing  Goal: Patient/Family Short Term Goal  Description: Patient's Short Term Goal: learn more about stroke     Interventions:   - stroke binder and education     - See additional Care Plan goals for specific interventions  Outcome: Progressing     Problem: CARDIOVASCULAR - ADULT  Goal: Absence of cardiac arrhythmias or at baseline  Description: INTERVENTIONS:  - Continuous cardiac monitoring, monitor vital signs, obtain 12 lead EKG if indicated  - Evaluate effectiveness of antiarrhythmic and heart rate control medications as ordered  - Initiate emergency measures for life threatening arrhythmias  - Monitor electrolytes and administer replacement therapy as ordered  Outcome: Progressing     Problem: GASTROINTESTINAL - ADULT  Goal: Maintains or returns to baseline bowel function  Description: INTERVENTIONS:  - Assess bowel function  - Maintain adequate hydration with IV or PO as ordered and tolerated  - Evaluate effectiveness of GI medications  - Encourage mobilization and activity  - Obtain nutritional consult as needed  - Establish a toileting routine/schedule  - Consider collaborating with pharmacy to review patient's medication profile  Outcome: Progressing     Problem: METABOLIC/FLUID AND ELECTROLYTES - ADULT  Goal: Electrolytes maintained within normal limits  Description: INTERVENTIONS:  - Monitor labs and rhythm and assess patient for signs and symptoms of electrolyte imbalances  - Administer electrolyte replacement as ordered  - Monitor response to electrolyte replacements, including rhythm and repeat lab results as appropriate  - Fluid restriction as ordered  - Instruct patient on fluid and nutrition restrictions as appropriate  Outcome: Progressing     Problem: NEUROLOGICAL - ADULT  Goal: Achieves stable or improved  neurological status  Description: INTERVENTIONS  - Assess for and report changes in neurological status  - Initiate measures to prevent increased intracranial pressure  - Maintain blood pressure and fluid volume within ordered parameters to optimize cerebral perfusion and minimize risk of hemorrhage  - Monitor temperature, glucose, and sodium. Initiate appropriate interventions as ordered  Outcome: Progressing     Problem: SAFETY ADULT - FALL  Goal: Free from fall injury  Description: INTERVENTIONS:  - Assess pt frequently for physical needs  - Identify cognitive and physical deficits and behaviors that affect risk of falls.  - [place] fall precautions as indicated by assessment.  - Educate pt/family on patient safety including physical limitations  - Instruct pt to call for assistance with activity based on assessment  - Modify environment to reduce risk of injury  - Provide assistive devices as appropriate  - Consider OT/PT consult to assist with strengthening/mobility  - Encourage toileting schedule  Outcome: Progressing     Problem: PAIN - ADULT  Goal: Verbalizes/displays adequate comfort level or patient's stated pain goal  Description: INTERVENTIONS:  - Encourage pt to monitor pain and request assistance  - Assess pain using appropriate pain scale  - Administer analgesics based on type and severity of pain and evaluate response  - Implement non-pharmacological measures as appropriate and evaluate response  - Consider cultural and social influences on pain and pain management  - Manage/alleviate anxiety  - Utilize distraction and/or relaxation techniques  - Monitor for opioid side effects  - Notify MD/LIP if interventions unsuccessful or patient reports new pain  - Anticipate increased pain with activity and pre-medicate as appropriate  Outcome: Progressing
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
## 2024-01-16 VITALS
HEIGHT: 65 IN | OXYGEN SATURATION: 98 % | DIASTOLIC BLOOD PRESSURE: 93 MMHG | RESPIRATION RATE: 18 BRPM | TEMPERATURE: 99 F | BODY MASS INDEX: 27.77 KG/M2 | SYSTOLIC BLOOD PRESSURE: 149 MMHG | WEIGHT: 166.69 LBS | HEART RATE: 93 BPM

## 2024-01-16 LAB
ANION GAP SERPL CALC-SCNC: 7 MMOL/L (ref 0–18)
BASOPHILS # BLD AUTO: 0.02 X10(3) UL (ref 0–0.2)
BASOPHILS NFR BLD AUTO: 0.3 %
BUN BLD-MCNC: 13 MG/DL (ref 9–23)
BUN/CREAT SERPL: 12 (ref 10–20)
CALCIUM BLD-MCNC: 8.3 MG/DL (ref 8.7–10.4)
CHLORIDE SERPL-SCNC: 108 MMOL/L (ref 98–112)
CO2 SERPL-SCNC: 26 MMOL/L (ref 21–32)
CREAT BLD-MCNC: 1.08 MG/DL
DEPRECATED RDW RBC AUTO: 43.1 FL (ref 35.1–46.3)
EGFRCR SERPLBLD CKD-EPI 2021: 85 ML/MIN/1.73M2 (ref 60–?)
EOSINOPHIL # BLD AUTO: 0.05 X10(3) UL (ref 0–0.7)
EOSINOPHIL NFR BLD AUTO: 0.7 %
ERYTHROCYTE [DISTWIDTH] IN BLOOD BY AUTOMATED COUNT: 12.2 % (ref 11–15)
GLUCOSE BLD-MCNC: 114 MG/DL (ref 70–99)
HCT VFR BLD AUTO: 31.4 %
HGB BLD-MCNC: 10.9 G/DL
IMM GRANULOCYTES # BLD AUTO: 0.05 X10(3) UL (ref 0–1)
IMM GRANULOCYTES NFR BLD: 0.7 %
LYMPHOCYTES # BLD AUTO: 0.76 X10(3) UL (ref 1–4)
LYMPHOCYTES NFR BLD AUTO: 10.9 %
MAGNESIUM SERPL-MCNC: 1.6 MG/DL (ref 1.6–2.6)
MCH RBC QN AUTO: 33.7 PG (ref 26–34)
MCHC RBC AUTO-ENTMCNC: 34.7 G/DL (ref 31–37)
MCV RBC AUTO: 97.2 FL
MONOCYTES # BLD AUTO: 0.21 X10(3) UL (ref 0.1–1)
MONOCYTES NFR BLD AUTO: 3 %
NEUTROPHILS # BLD AUTO: 5.91 X10 (3) UL (ref 1.5–7.7)
NEUTROPHILS # BLD AUTO: 5.91 X10(3) UL (ref 1.5–7.7)
NEUTROPHILS NFR BLD AUTO: 84.4 %
OSMOLALITY SERPL CALC.SUM OF ELEC: 293 MOSM/KG (ref 275–295)
PLATELET # BLD AUTO: 104 10(3)UL (ref 150–450)
POTASSIUM SERPL-SCNC: 3.8 MMOL/L (ref 3.5–5.1)
RBC # BLD AUTO: 3.23 X10(6)UL
SODIUM SERPL-SCNC: 141 MMOL/L (ref 136–145)
WBC # BLD AUTO: 7 X10(3) UL (ref 4–11)

## 2024-01-16 PROCEDURE — 99233 SBSQ HOSP IP/OBS HIGH 50: CPT | Performed by: OTHER

## 2024-01-16 RX ORDER — AMLODIPINE BESYLATE 5 MG/1
5 TABLET ORAL DAILY
Qty: 30 TABLET | Refills: 0 | Status: SHIPPED | OUTPATIENT
Start: 2024-01-17

## 2024-01-16 RX ORDER — ATORVASTATIN CALCIUM 80 MG/1
80 TABLET, FILM COATED ORAL NIGHTLY
Qty: 30 TABLET | Refills: 0 | Status: SHIPPED | OUTPATIENT
Start: 2024-01-16

## 2024-01-16 RX ORDER — ENALAPRIL MALEATE 5 MG/1
10 TABLET ORAL DAILY
Status: DISCONTINUED | OUTPATIENT
Start: 2024-01-16 | End: 2024-01-16

## 2024-01-16 RX ORDER — VANCOMYCIN HYDROCHLORIDE 125 MG/1
125 CAPSULE ORAL 4 TIMES DAILY
Qty: 50 CAPSULE | Refills: 0 | Status: SHIPPED | OUTPATIENT
Start: 2024-01-16 | End: 2024-01-29

## 2024-01-16 RX ORDER — GABAPENTIN 300 MG/1
300 CAPSULE ORAL 3 TIMES DAILY
Qty: 90 CAPSULE | Refills: 0 | Status: SHIPPED | OUTPATIENT
Start: 2024-01-16

## 2024-01-16 RX ORDER — MAGNESIUM OXIDE 400 MG/1
400 TABLET ORAL ONCE
Status: COMPLETED | OUTPATIENT
Start: 2024-01-16 | End: 2024-01-16

## 2024-01-16 RX ORDER — CLOPIDOGREL BISULFATE 75 MG/1
75 TABLET ORAL DAILY
Qty: 30 TABLET | Refills: 0 | Status: SHIPPED | OUTPATIENT
Start: 2024-01-17

## 2024-01-16 RX ORDER — PANTOPRAZOLE SODIUM 40 MG/1
40 TABLET, DELAYED RELEASE ORAL
Qty: 30 TABLET | Refills: 0 | Status: SHIPPED | OUTPATIENT
Start: 2024-01-17

## 2024-01-16 RX ORDER — POTASSIUM CHLORIDE 1.5 G/1.58G
40 POWDER, FOR SOLUTION ORAL ONCE
Status: COMPLETED | OUTPATIENT
Start: 2024-01-16 | End: 2024-01-16

## 2024-01-16 NOTE — PAYOR COMM NOTE
--------------  1/16 CONTINUED STAY REVIEW    Payor: [facility]  Subscriber #:  [id number]  Authorization Number: [id number]       CARDS:    Impression:  ?TIA  Elevated troponin - unlikely ACS, no anginal symptoms  HTN  YANG  C-diff toxin positive     Recommendations:  - Continue aspirin and plavix (per neuro)  - Will likely do stress test as oupatient for further work up   - Continue amlodipine 5mg daily   - Resume enlaparil 10mg daily (home med) as Cr has improved  - Monitor tele.         Subjective:   Pt reports that his symptoms have improved and the left hand tingling sensation has now almost resolved. Denies any chest pain, SOB or palpitations.        Objective:   Temp: 99.4 °F (37.4 °C)  Pulse: 96  Resp: 18  BP: 153/87      Tele: NSR     Physical Exam:     General: Alert and oriented x 3. No apparent distress. No respiratory or constitutional distress.  HEENT: Normocephalic, anicteric sclera, neck supple, no thyromegaly or adenopathy.  Neck: No JVD, carotids 2+, no bruits.  Cardiac: Regular rate and rhythm. S1, S2 normal. No murmur, pericardial rub, S3, thrill, heave or extra cardiac sounds.  Lungs: Clear without wheezes, rales, rhonchi or dullness.  Normal excursions and effort.  Abdomen: Soft, non-tender. No organosplenomegally, mass or rebound, BS-present.  Extremities: Without clubbing, cyanosis or edema.  Peripheral pulses are 2+.  Neurologic: Alert and oriented, normal affect. No motor or coordinational deficit.  Skin: Warm and dry.      Lab 01/14/24  1642 01/15/24  0825 01/16/24  0506   WBC 22.8* 13.2* 7.0   HGB 13.3 12.5* 10.9*   MCV 99.5 96.3 97.2   .0 157.0 104.0*   INR 1.14  --   --        138 141   K 3.7 4.4  4.4 3.8    106 108   CO2 25.0 25.0 26.0   BUN 13 16 13   CREATSERUM 1.61* 1.24 1.08   CA 8.7 8.4* 8.3*   MG  --   --  1.6   GLU 94 96 114*        MEDICATIONS ADMINISTERED IN LAST 1 DAY:  acetaminophen (Tylenol) tab 650 mg       Date Action Dose Route User    1/15/2024 4933  Given 650 mg Oral Amber Miranda RN          amLODIPine (Norvasc) tab 5 mg       Date Action Dose Route User    1/16/2024 1027 Given 5 mg Oral Leopoldo Gutiérrez RN    1/15/2024 1401 Given 5 mg Oral Leopoldo Gutiérrez RN          atorvastatin (Lipitor) tab 80 mg       Date Action Dose Route User    1/15/2024 2214 Given 80 mg Oral Amber Miranda RN          clopidogrel (Plavix) tab 75 mg       Date Action Dose Route User    1/16/2024 1244 Given 75 mg Oral Leopoldo Gutiérrez RN          enalapril (Vasotec) tab 10 mg       Date Action Dose Route User    1/16/2024 1026 Given 10 mg Oral Leopoldo Gutiérrez RN          folic acid (Folvite) tab 1 mg       Date Action Dose Route User    1/16/2024 1027 Given 1 mg Oral Leopoldo Gutiérrez RN    1/15/2024 1401 Given 1 mg Oral Leopoldo Gutiérrez RN          gabapentin (Neurontin) cap 300 mg       Date Action Dose Route User    1/16/2024 1027 Given 300 mg Oral Leopoldo Gutiérrez RN    1/15/2024 2214 Given 300 mg Oral Amber Miranda RN    1/15/2024 1715 Given 300 mg Oral Leopoldo Gutiérrez RN          HYDROcodone-acetaminophen (Norco) 5-325 MG per tab 1 tablet       Date Action Dose Route User    1/16/2024 1030 Given 1 tablet Oral Leopoldo Gutiérrez RN    1/15/2024 2318 Given 1 tablet Oral Amber Miranda RN    1/15/2024 1715 Given 1 tablet Oral Leopoldo Gutiérrez RN          magnesium oxide (Mag-Ox) tab 400 mg       Date Action Dose Route User    1/16/2024 1026 Given 400 mg Oral Leopoldo Gutiérrez RN          pantoprazole (Protonix) DR tab 40 mg       Date Action Dose Route User    1/16/2024 0609 Given 40 mg Oral Amber Miranda RN    1/15/2024 1401 Given 40 mg Oral Leopoldo Gutiérrez RN          potassium chloride (Klor-Con) 20 MEQ oral powder 40 mEq       Date Action Dose Route User    1/16/2024 1026 Given 40 mEq Oral Leopoldo Gutiérrez RN          simethicone (Mylicon) chewable tab 80 mg       Date Action Dose Route User    1/16/2024 1244 Given 80 mg Oral Leopoldo Gutiérrez RN    1/16/2024 1027 Given 80 mg Oral Brock,  ENID Mina    1/15/2024 2214 Given 80 mg Oral SestosoAmber RN    1/15/2024 1715 Given 80 mg Oral Leopoldo Gutiérrez RN          vancomycin (Vancocin) cap 125 mg       Date Action Dose Route User    1/16/2024 1244 Given 125 mg Oral Leopoldo Gutiérrez RN    1/16/2024 1027 Given 125 mg Oral Leopoldo Gutiérrez RN    1/15/2024 2214 Given 125 mg Oral Sestoso, Amber Leija RN    1/15/2024 1715 Given 125 mg Oral Leopoldo Gutiérrez RN    1/15/2024 1401 Given 125 mg Oral eLopoldo Gutiérrez RN            Vitals (last day)       Date/Time Temp Pulse Resp BP SpO2 Weight O2 Device O2 Flow Rate (L/min) [facility]    01/16/24 1024 98.5 °F (36.9 °C) 93 18 149/93 98 % -- None (Room air) --     01/16/24 0608 99.4 °F (37.4 °C) 96 18 153/87 97 % -- None (Room air) --     01/16/24 0450 -- -- -- -- -- 166 lb 11.2 oz -- -- RA    01/16/24 0239 99 °F (37.2 °C) 90 18 137/90 97 % -- None (Room air) --     01/15/24 2322 99.2 °F (37.3 °C) 84 -- -- -- -- -- --     01/15/24 2213 100.6 °F (38.1 °C) 93 18 159/86 98 % -- None (Room air) --     01/15/24 1900 -- 103 -- -- -- -- -- -- CY    01/15/24 1713 98.5 °F (36.9 °C) 89 16 163/95 97 % -- None (Room air) --     01/15/24 1400 98.5 °F (36.9 °C) 93 16 157/92 98 % -- None (Room air) -- NN    01/15/24 1119 -- -- -- 171/101 -- -- -- -- TL    01/15/24 1107 -- 85 -- 151/91 -- -- -- -- TL    01/15/24 0820 98.6 °F (37 °C) 102 18 142/88 98 % -- None (Room air) -- NN    01/15/24 0436 -- -- -- -- -- 170 lb 12.8 oz -- -- PM    01/15/24 0400 98.6 °F (37 °C) 84 18 141/97 98 % -- None (Room air) -- SS    01/15/24 0200 98.4 °F (36.9 °C) -- 18 142/90 98 % -- None (Room air) -- SS    01/15/24 0011 -- -- -- -- -- 170 lb 12.8 oz -- -- SS    01/15/24 0002 98.4 °F (36.9 °C) 96 18 151/81 98 % -- None (Room air) --

## 2024-01-16 NOTE — SLP NOTE
SPEECH DAILY NOTE - INPATIENT    ASSESSMENT & PLAN   ASSESSMENT  PPE REQUIRED. THIS THERAPIST WORE GLOVES, GOWN, AND MASK FOR DURATION OF EVALUATION. HANDS WASHED UPON ENTRANCE/EXIT.    SLP f/u for ongoing meal assessment per recommendations of regular/thin liquids per BSE. RN reports pt tolerates diet and medication well with no overt clinical s/s aspiration. Pt denies any swallowing challenges.     Pt positioned upright in bed, alert/cooperative. Pt afebrile, tolerating room air with oxygen status 97% prior to the start of oral trials. SLP reviewed aspiration precautions and safe swallowing compensatory strategies with the patient. Safe swallow guidelines remain written on the white board in purple. Patient v/u. Provided no assistance, pt tolerates hard solids and thin liquids via straw with no overt clinical signs/symptoms of aspiration. Oxygen status remained stable t/o the entire session. Recommend remain on regular/thin liquids with adherence to aspiration precautions and swallow strategies. No further swallow services warranted at this time. Please re consult if needed. RN alerted with results and recommendations.     MOST RECENT CXR 1/14  CONCLUSION: No acute cardiopulmonary abnormality.  Stable mild cardiomegaly.         Diet Recommendations - Solids: Regular  Diet Recommendations - Liquids: Thin Liquids    Compensatory Strategies Recommended: Slow rate  Aspiration Precautions: Upright position;Slow rate  Medication Administration Recommendations: No restrictions    Patient Experiencing Pain: No                Discharge Recommendations  Discharge Recommendations/Plan: Undetermined    Treatment Plan  Treatment Plan/Recommendations: No further inpatient SLP service warranted    Interdisciplinary Communication: Discussed with RN            GOALS  Goal #1 The patient will tolerate regular consistency and thin liquids without overt signs or symptoms of aspiration with 100 % accuracy over 1 session(s).  Goal Met  1/16   Goal #2 The patient/family/caregiver will demonstrate understanding and implementation of aspiration precautions and swallow strategies independently over 1 session(s).    Goal Met 1/16     FOLLOW UP  Follow Up Needed (Documentation Required): No  SLP Follow-up Date: 01/16/24  Number of Visits to Meet Established Goals:  (1-2x)    Session: 1    If you have any questions, please contact CARA Ragland M.S. CCC-SLP  Speech Language Pathologist  Phone Number [phone]

## 2024-01-16 NOTE — PROGRESS NOTES
[facility]    Cardiology Progress Note    Donell Redmond Patient Status:  Inpatient    1975 MRN [id number]   Location [facility] 3W/SW Attending Gabino Coon MD   Hosp Day # 2 PCP SCOTT ALBRECHT       Impression:  ?TIA  Elevated troponin - unlikely ACS, no anginal symptoms  HTN  YANG  C-diff toxin positive    Recommendations:  - Continue aspirin and plavix (per neuro)  - Will likely do stress test as oupatient for further work up   - Continue amlodipine 5mg daily   - Resume enlaparil 10mg daily (home med) as Cr has improved  - Monitor tele.        Subjective:   Pt reports that his symptoms have improved and the left hand tingling sensation has now almost resolved. Denies any chest pain, SOB or palpitations.     Patient Active Problem List   Diagnosis    Right arm numbness    Right arm weakness    Cerebrovascular accident (CVA), unspecified mechanism (HCC)       Objective:   Temp: 99.4 °F (37.4 °C)  Pulse: 96  Resp: 18  BP: 153/87    Intake/Output:     Intake/Output Summary (Last 24 hours) at 2024 0918  Last data filed at 1/15/2024 1533  Gross per 24 hour   Intake 440 ml   Output --   Net 440 ml       Last 3 Weights   24 0450 166 lb 11.2 oz (75.6 kg)   01/15/24 0436 170 lb 12.8 oz (77.5 kg)   01/15/24 0011 170 lb 12.8 oz (77.5 kg)   24 1656 175 lb 7.8 oz (79.6 kg)   24 2044 170 lb 12.8 oz (77.5 kg)   22 0630 192 lb (87.1 kg)       Tele: NSR    Physical Exam:    General: Alert and oriented x 3. No apparent distress. No respiratory or constitutional distress.  HEENT: Normocephalic, anicteric sclera, neck supple, no thyromegaly or adenopathy.  Neck: No JVD, carotids 2+, no bruits.  Cardiac: Regular rate and rhythm. S1, S2 normal. No murmur, pericardial rub, S3, thrill, heave or extra cardiac sounds.  Lungs: Clear without wheezes, rales, rhonchi or dullness.  Normal excursions and effort.  Abdomen: Soft, non-tender. No organosplenomegally, mass or rebound,  BS-present.  Extremities: Without clubbing, cyanosis or edema.  Peripheral pulses are 2+.  Neurologic: Alert and oriented, normal affect. No motor or coordinational deficit.  Skin: Warm and dry.     Laboratory/Data:    Labs:         Recent Labs   Lab 01/14/24  1642 01/15/24  0825 01/16/24  0506   WBC 22.8* 13.2* 7.0   HGB 13.3 12.5* 10.9*   MCV 99.5 96.3 97.2   .0 157.0 104.0*   INR 1.14  --   --        Recent Labs   Lab 01/14/24  1642 01/15/24  0825 01/16/24  0506    138 141   K 3.7 4.4  4.4 3.8    106 108   CO2 25.0 25.0 26.0   BUN 13 16 13   CREATSERUM 1.61* 1.24 1.08   CA 8.7 8.4* 8.3*   MG  --   --  1.6   GLU 94 96 114*       Recent Labs   Lab 01/14/24  1642 01/15/24  0825   ALT 72* 121*   * 292*   ALB 4.3 3.9       No results for input(s): \"TROP\" in the last 168 hours.    Allergies:   No Known Allergies    Medications:  Current Facility-Administered Medications   Medication Dose Route Frequency    potassium chloride (Klor-Con) 20 MEQ oral powder 40 mEq  40 mEq Oral Once    magnesium oxide (Mag-Ox) tab 400 mg  400 mg Oral Once    clopidogrel (Plavix) tab 75 mg  75 mg Oral Daily    pantoprazole (Protonix) DR tab 40 mg  40 mg Oral QAM AC    gabapentin (Neurontin) cap 300 mg  300 mg Oral TID    folic acid (Folvite) tab 1 mg  1 mg Oral Daily    vancomycin (Vancocin) cap 125 mg  125 mg Oral QID    amLODIPine (Norvasc) tab 5 mg  5 mg Oral Daily    HYDROcodone-acetaminophen (Norco) 5-325 MG per tab 1 tablet  1 tablet Oral Q6H PRN    acetaminophen (Tylenol) tab 650 mg  650 mg Oral Q4H PRN    Or    acetaminophen (Tylenol) rectal suppository 650 mg  650 mg Rectal Q4H PRN    labetalol (Trandate) 5 mg/mL injection 10 mg  10 mg Intravenous Q10 Min PRN    hydrALAzine (Apresoline) 20 mg/mL injection 10 mg  10 mg Intravenous Q2H PRN    ondansetron (Zofran) 4 MG/2ML injection 4 mg  4 mg Intravenous Q6H PRN    prochlorperazine (Compazine) 10 MG/2ML injection 5 mg  5 mg Intravenous Q8H PRN    aspirin  tab 325 mg  325 mg Oral Daily    atorvastatin (Lipitor) tab 80 mg  80 mg Oral Nightly    melatonin tab 3 mg  3 mg Oral Nightly PRN    polyethylene glycol (PEG 3350) (Miralax) 17 g oral packet 17 g  17 g Oral Daily PRN    sennosides (Senokot) tab 17.2 mg  17.2 mg Oral Nightly PRN    bisacodyl (Dulcolax) 10 MG rectal suppository 10 mg  10 mg Rectal Daily PRN    fleet enema (Fleet) 7-19 GM/118ML rectal enema 133 mL  1 enema Rectal Once PRN    simethicone (Mylicon) chewable tab 80 mg  80 mg Oral TID CC and HS       Jorge Pena DO  1/16/2024  9:18 AM

## 2024-01-16 NOTE — PLAN OF CARE
Patient alert and oriented x 4. Vitals stable on room air. Patient's pain managed with PO medication. Patient cleared for discharge home with outpatient follow-up. AVS completed and discussed with patient. There were no further questions. Patient to transport home by brother.    Problem: Patient Centered Care  Goal: Patient preferences are identified and integrated in the patient's plan of care  Description: Interventions:  - What would you like us to know as we care for you? From home with brother and sister  - Provide timely, complete, and accurate information to patient/family  - Incorporate patient and family knowledge, values, beliefs, and cultural backgrounds into the planning and delivery of care  - Encourage patient/family to participate in care and decision-making at the level they choose  - Honor patient and family perspectives and choices  Outcome: Adequate for Discharge     Problem: Patient/Family Goals  Goal: Patient/Family Long Term Goal  Description: Patient's Long Term Goal: decrease stroke symptoms      Interventions:  - monitor vitals, monitor labs  - MRI  - echo   - See additional Care Plan goals for specific interventions  Outcome: Adequate for Discharge  Goal: Patient/Family Short Term Goal  Description: Patient's Short Term Goal: learn more about stroke     Interventions:   - stroke binder and education     - See additional Care Plan goals for specific interventions  Outcome: Adequate for Discharge     Problem: CARDIOVASCULAR - ADULT  Goal: Absence of cardiac arrhythmias or at baseline  Description: INTERVENTIONS:  - Continuous cardiac monitoring, monitor vital signs, obtain 12 lead EKG if indicated  - Evaluate effectiveness of antiarrhythmic and heart rate control medications as ordered  - Initiate emergency measures for life threatening arrhythmias  - Monitor electrolytes and administer replacement therapy as ordered  Outcome: Adequate for Discharge     Problem: GASTROINTESTINAL -  ADULT  Goal: Maintains or returns to baseline bowel function  Description: INTERVENTIONS:  - Assess bowel function  - Maintain adequate hydration with IV or PO as ordered and tolerated  - Evaluate effectiveness of GI medications  - Encourage mobilization and activity  - Obtain nutritional consult as needed  - Establish a toileting routine/schedule  - Consider collaborating with pharmacy to review patient's medication profile  Outcome: Adequate for Discharge     Problem: METABOLIC/FLUID AND ELECTROLYTES - ADULT  Goal: Electrolytes maintained within normal limits  Description: INTERVENTIONS:  - Monitor labs and rhythm and assess patient for signs and symptoms of electrolyte imbalances  - Administer electrolyte replacement as ordered  - Monitor response to electrolyte replacements, including rhythm and repeat lab results as appropriate  - Fluid restriction as ordered  - Instruct patient on fluid and nutrition restrictions as appropriate  Outcome: Adequate for Discharge     Problem: NEUROLOGICAL - ADULT  Goal: Achieves stable or improved neurological status  Description: INTERVENTIONS  - Assess for and report changes in neurological status  - Initiate measures to prevent increased intracranial pressure  - Maintain blood pressure and fluid volume within ordered parameters to optimize cerebral perfusion and minimize risk of hemorrhage  - Monitor temperature, glucose, and sodium. Initiate appropriate interventions as ordered  Outcome: Adequate for Discharge     Problem: SAFETY ADULT - FALL  Goal: Free from fall injury  Description: INTERVENTIONS:  - Assess pt frequently for physical needs  - Identify cognitive and physical deficits and behaviors that affect risk of falls.  - [place] fall precautions as indicated by assessment.  - Educate pt/family on patient safety including physical limitations  - Instruct pt to call for assistance with activity based on assessment  - Modify environment to reduce risk of injury  -  Provide assistive devices as appropriate  - Consider OT/PT consult to assist with strengthening/mobility  - Encourage toileting schedule  Outcome: Adequate for Discharge     Problem: PAIN - ADULT  Goal: Verbalizes/displays adequate comfort level or patient's stated pain goal  Description: INTERVENTIONS:  - Encourage pt to monitor pain and request assistance  - Assess pain using appropriate pain scale  - Administer analgesics based on type and severity of pain and evaluate response  - Implement non-pharmacological measures as appropriate and evaluate response  - Consider cultural and social influences on pain and pain management  - Manage/alleviate anxiety  - Utilize distraction and/or relaxation techniques  - Monitor for opioid side effects  - Notify MD/LIP if interventions unsuccessful or patient reports new pain  - Anticipate increased pain with activity and pre-medicate as appropriate  Outcome: Adequate for Discharge

## 2024-01-16 NOTE — PLAN OF CARE
Patient alert and oriented x 4. Vitals stable on room air. Patient's pain managed with PO medication. Patient is neuro checks Q shift, NIH daily. Plan to monitor and discharge once medically cleared. Call light within reach.    Problem: Patient Centered Care  Goal: Patient preferences are identified and integrated in the patient's plan of care  Description: Interventions:  - What would you like us to know as we care for you? From home with brother and sister  - Provide timely, complete, and accurate information to patient/family  - Incorporate patient and family knowledge, values, beliefs, and cultural backgrounds into the planning and delivery of care  - Encourage patient/family to participate in care and decision-making at the level they choose  - Honor patient and family perspectives and choices  Outcome: Progressing     Problem: Patient/Family Goals  Goal: Patient/Family Long Term Goal  Description: Patient's Long Term Goal: decrease stroke symptoms      Interventions:  - monitor vitals, monitor labs  - MRI  - echo   - See additional Care Plan goals for specific interventions  Outcome: Progressing  Goal: Patient/Family Short Term Goal  Description: Patient's Short Term Goal: learn more about stroke     Interventions:   - stroke binder and education     - See additional Care Plan goals for specific interventions  Outcome: Progressing     Problem: CARDIOVASCULAR - ADULT  Goal: Absence of cardiac arrhythmias or at baseline  Description: INTERVENTIONS:  - Continuous cardiac monitoring, monitor vital signs, obtain 12 lead EKG if indicated  - Evaluate effectiveness of antiarrhythmic and heart rate control medications as ordered  - Initiate emergency measures for life threatening arrhythmias  - Monitor electrolytes and administer replacement therapy as ordered  Outcome: Progressing     Problem: GASTROINTESTINAL - ADULT  Goal: Maintains or returns to baseline bowel function  Description: INTERVENTIONS:  - Assess bowel  function  - Maintain adequate hydration with IV or PO as ordered and tolerated  - Evaluate effectiveness of GI medications  - Encourage mobilization and activity  - Obtain nutritional consult as needed  - Establish a toileting routine/schedule  - Consider collaborating with pharmacy to review patient's medication profile  Outcome: Progressing     Problem: METABOLIC/FLUID AND ELECTROLYTES - ADULT  Goal: Electrolytes maintained within normal limits  Description: INTERVENTIONS:  - Monitor labs and rhythm and assess patient for signs and symptoms of electrolyte imbalances  - Administer electrolyte replacement as ordered  - Monitor response to electrolyte replacements, including rhythm and repeat lab results as appropriate  - Fluid restriction as ordered  - Instruct patient on fluid and nutrition restrictions as appropriate  Outcome: Progressing     Problem: NEUROLOGICAL - ADULT  Goal: Achieves stable or improved neurological status  Description: INTERVENTIONS  - Assess for and report changes in neurological status  - Initiate measures to prevent increased intracranial pressure  - Maintain blood pressure and fluid volume within ordered parameters to optimize cerebral perfusion and minimize risk of hemorrhage  - Monitor temperature, glucose, and sodium. Initiate appropriate interventions as ordered  Outcome: Progressing     Problem: SAFETY ADULT - FALL  Goal: Free from fall injury  Description: INTERVENTIONS:  - Assess pt frequently for physical needs  - Identify cognitive and physical deficits and behaviors that affect risk of falls.  - [place] fall precautions as indicated by assessment.  - Educate pt/family on patient safety including physical limitations  - Instruct pt to call for assistance with activity based on assessment  - Modify environment to reduce risk of injury  - Provide assistive devices as appropriate  - Consider OT/PT consult to assist with strengthening/mobility  - Encourage toileting  schedule  Outcome: Progressing     Problem: PAIN - ADULT  Goal: Verbalizes/displays adequate comfort level or patient's stated pain goal  Description: INTERVENTIONS:  - Encourage pt to monitor pain and request assistance  - Assess pain using appropriate pain scale  - Administer analgesics based on type and severity of pain and evaluate response  - Implement non-pharmacological measures as appropriate and evaluate response  - Consider cultural and social influences on pain and pain management  - Manage/alleviate anxiety  - Utilize distraction and/or relaxation techniques  - Monitor for opioid side effects  - Notify MD/LIP if interventions unsuccessful or patient reports new pain  - Anticipate increased pain with activity and pre-medicate as appropriate  Outcome: Progressing

## 2024-01-16 NOTE — PLAN OF CARE
Patient alert and oriented x4. Neuro check performed. No new deficit noted. Continues to report R and L hand numbness. Reports pain to abdomen. Severe pain with activity. Norco administered with pain management. Fever overnight. 100.6F max. Tylenol administered. On IVF 0.9 ns @ 50ml/hr. Receiving PO vanc for Cdiff. Plan for home upon discharge pending medical clearance.     Problem: Patient Centered Care  Goal: Patient preferences are identified and integrated in the patient's plan of care  Description: Interventions:  - What would you like us to know as we care for you? From home with brother and sister  - Provide timely, complete, and accurate information to patient/family  - Incorporate patient and family knowledge, values, beliefs, and cultural backgrounds into the planning and delivery of care  - Encourage patient/family to participate in care and decision-making at the level they choose  - Honor patient and family perspectives and choices  Outcome: Progressing     Problem: CARDIOVASCULAR - ADULT  Goal: Absence of cardiac arrhythmias or at baseline  Description: INTERVENTIONS:  - Continuous cardiac monitoring, monitor vital signs, obtain 12 lead EKG if indicated  - Evaluate effectiveness of antiarrhythmic and heart rate control medications as ordered  - Initiate emergency measures for life threatening arrhythmias  - Monitor electrolytes and administer replacement therapy as ordered  Outcome: Progressing     Problem: GASTROINTESTINAL - ADULT  Goal: Maintains or returns to baseline bowel function  Description: INTERVENTIONS:  - Assess bowel function  - Maintain adequate hydration with IV or PO as ordered and tolerated  - Evaluate effectiveness of GI medications  - Encourage mobilization and activity  - Obtain nutritional consult as needed  - Establish a toileting routine/schedule  - Consider collaborating with pharmacy to review patient's medication profile  Outcome: Progressing     Problem: METABOLIC/FLUID AND  ELECTROLYTES - ADULT  Goal: Electrolytes maintained within normal limits  Description: INTERVENTIONS:  - Monitor labs and rhythm and assess patient for signs and symptoms of electrolyte imbalances  - Administer electrolyte replacement as ordered  - Monitor response to electrolyte replacements, including rhythm and repeat lab results as appropriate  - Fluid restriction as ordered  - Instruct patient on fluid and nutrition restrictions as appropriate  Outcome: Progressing     Problem: NEUROLOGICAL - ADULT  Goal: Achieves stable or improved neurological status  Description: INTERVENTIONS  - Assess for and report changes in neurological status  - Initiate measures to prevent increased intracranial pressure  - Maintain blood pressure and fluid volume within ordered parameters to optimize cerebral perfusion and minimize risk of hemorrhage  - Monitor temperature, glucose, and sodium. Initiate appropriate interventions as ordered  Outcome: Progressing     Problem: SAFETY ADULT - FALL  Goal: Free from fall injury  Description: INTERVENTIONS:  - Assess pt frequently for physical needs  - Identify cognitive and physical deficits and behaviors that affect risk of falls.  - [place] fall precautions as indicated by assessment.  - Educate pt/family on patient safety including physical limitations  - Instruct pt to call for assistance with activity based on assessment  - Modify environment to reduce risk of injury  - Provide assistive devices as appropriate  - Consider OT/PT consult to assist with strengthening/mobility  - Encourage toileting schedule  Outcome: Progressing     Problem: PAIN - ADULT  Goal: Verbalizes/displays adequate comfort level or patient's stated pain goal  Description: INTERVENTIONS:  - Encourage pt to monitor pain and request assistance  - Assess pain using appropriate pain scale  - Administer analgesics based on type and severity of pain and evaluate response  - Implement non-pharmacological measures as  appropriate and evaluate response  - Consider cultural and social influences on pain and pain management  - Manage/alleviate anxiety  - Utilize distraction and/or relaxation techniques  - Monitor for opioid side effects  - Notify MD/LIP if interventions unsuccessful or patient reports new pain  - Anticipate increased pain with activity and pre-medicate as appropriate  Outcome: Progressing

## 2024-01-16 NOTE — DISCHARGE SUMMARY
General Medicine Discharge Summary     Patient ID:  Donell Redmond  48 year old  2/24/1975    Admit date: 1/14/2024    Discharge date and time: 1/16/24    Attending Physician: Gabino Coon MD     Consults: IP CONSULT TO CARDIOLOGY  IP CONSULT TO NEUROLOGY  IP CONSULT TO SOCIAL WORK    Primary Care Physician: SCOTT ALBRECHT     Reason for admission:   CVA rule out  Right hand numbness and tingling    Risk For Readmission: low    Discharge Diagnoses: Cerebrovascular accident (CVA), unspecified mechanism (HCC) [I63.9]  See Additional Discharge Diagnoses in Hospital Course    Discharged Condition: stable    Follow-up with labs/images appointments: PCP, Neurology     Exam  General: Alert, no acute distress  HEENT: atraumatic, sclera anicteric, oral mucosa normal   Neck: non tender, no adenopathy   Lungs: clear to ausculation bilaterally, no wheezing  Heart: Regular rate and rhythm  Abdomen: soft, non tender, non distended   Extremities: No edema  Skin: no new rash, normal color  Neuro: 5/5 strength in bilateral extremities, normal sensations  Psych: appropriate affect     HPI: Patient is a 48 year old male with PMH sig for HTN not on any medications, who presents with right hand numbness and change in speech.  Symptoms started yesterday after he woke up from the nap in the late afternoon.  He had never felt symptoms like this in the past and he was asymptomatic before he fell asleep.  He spoke to a neighbor who thought he was having a stroke and the neighbor felt that there was some change in the patient's speech.  Patient denies any headache or dizziness, loss of consciousness, loss of bowel or bladder control.  States he has no chest pain, shortness of breath, abdominal pain, nausea or vomiting.  Patient is having some loose stools and feels there may have been some blood in his stools.  Patient works as a  and thinks he has hemorrhoids.     Hospital Course:   Patient is a 48 year old male with PMH sig  for HTN not on any medications, who presents with right hand numbness and change in speech.  CT head, CTA, MRI brain with no signs of acute neurological events.  Will start on Plavix and statin.  Patient also having GI distress with diarrhea with some blood in stools.  Found to have positive C. difficile with resolution of diarrhea and blood.  Will continue Plavix on discharge.  Statin.  Will plan to finish 14-day course of oral vancomycin.  Unclear as to cause of right arm paresthesia but seen improvement with low-dose gabapentin.  Will plan to follow-up with neurology and PCP in the next 1 to 2 weeks.  Will resume home enalapril and add amlodipine on discharge.    CVA rule out  Right hand numbness and tingling  Change in speech?  -CT head, CTA head and neck, MRI brain with no signs of acute event  -Neurology consulted, stroke protocol  -Echo reviewed, speech, PT/OT  -Statin, dual antiplatelet therapy initiated  -Gabapentin added for neuropathic pain     Troponin elevation  -Likely due to demand ischemia  -CTA chest with no pulmonary embolism or dissection  -Seen by cardiology with no need for heparin drip at this time  -Will continue with plavix      HTN  -Not on home medications  -Will start with amlodipine  -resume home enalapril      Distend esophagus  Transaminitis  -Seen on CT with fluid concerning for dysmotility, abdominal distention  -No evidence of obstruction  -Recent diarrhea with possible blood  - with possible hemorrhoids  -PPI  -Continue plavix   -Stool studies with C. difficile, potential cause for bleeding  -Start oral vancomycin for 14 days      YANG resolved   -Likely prerenal  -Resolved with IV fluids     Leukocytosis resolved   -Likely reactive due to above, improving with IV fluids    Operative Procedures:      Imaging: XR ABDOMEN OBSTRUCTIVE SERIES ROUTINE(2 VW)(CPT=74019)    Result Date: 1/15/2024  CONCLUSION:  1. No acute finding.    Dictated by (CST): Toney Puri MD on  1/15/2024 at 4:37 PM     Finalized by (CST): Toney Puri MD on 1/15/2024 at 4:38 PM          MRI BRAIN WO ACUTE (3) SEQUENCE (CPT=70551)    Result Date: 1/15/2024  CONCLUSION:   No acute intracranial abnormality.  Small chronic right lamina papyracea fracture with herniation of the right medial extraconal fat towards the fracture defect.  Lesser incidental findings described above.    Dictated by (CST): Ariel Cervantes MD on 1/15/2024 at 6:11 AM     Finalized by (CST): Ariel Cervantes MD on 1/15/2024 at 6:16 AM          CTA CHEST+CTA ABDOMEN DISSECT SET (CPT=71275/73165)    Result Date: 1/14/2024  CONCLUSION:  1.  No acute pulmonary embolus to the subsegmental pulmonary artery level.  No acute aortic injury; no dissection.  CTA imaging of the aorta and its major branch vessels shows patency without high-grade stenosis or occlusion.  No dissection. 2.  Cardiomegaly.  Pulmonary artery enlargement.  Findings suggest pulmonary artery hypertension. 3.  No aortic aneurysm or dissection involving the thoracic or abdominal aorta. 4.  Esophagus is distended with a large column of fluid.  This could represent changes of esophageal dysmotility, gastroesophageal reflux, recent emesis, or gastroesophageal junction stricture/achalasia.  Stomach is also distended with fluid without convincing evidence for gastric obstruction. 5.  Colonic diverticulosis. 6.  Periampullary duodenal diverticulum.  No biliary ductal dilatation.    Dictated by (CST): Landen Bob MD on 1/14/2024 at 5:38 PM     Finalized by (CST): Landen Bob MD on 1/14/2024 at 5:43 PM          XR CHEST AP PORTABLE  (CPT=71045)    Result Date: 1/14/2024  CONCLUSION: No acute cardiopulmonary abnormality.  Stable mild cardiomegaly.   Dictated by (CST): Landen Bob MD on 1/14/2024 at 5:36 PM     Finalized by (CST): Landen Bob MD on 1/14/2024 at 5:37 PM          CT STROKE CTA BRAIN/CTA NECK (W IV)(CPT=70496/77533)    Result Date: 1/14/2024  CONCLUSION:  1. CTA head:   Hypoplastic right A1 anterior cerebral artery segment, normal anatomic variant.  Otherwise, no significant stenosis, occlusion, or gross aneurysm in the anterior or posterior circulation. 2. CTA neck: No significant stenosis, occlusion, or dissection of the carotids or vertebrals.   Exam discussed with Dr. Reyes on 1/14/24 at 5:05 p.m.  Dictated by (CST): Landen Bob MD on 1/14/2024 at 5:07 PM     Finalized by (CST): Landen Bob MD on 1/14/2024 at 5:09 PM          CT STROKE BRAIN (NO IV)(CPT=70450)    Result Date: 1/14/2024  CONCLUSION: No acute intracranial process.   Exam discussed with Dr. Reyes on 1/14/24 at 5:05 p.m.   Dictated by (CST): Landen Bob MD on 1/14/2024 at 4:57 PM     Finalized by (CST): Landen Bob MD on 1/14/2024 at 5:06 PM             Disposition: home    Activity: as tolerated   Diet: general   Wound Care: no needs  Code Status: Full Code  O2: no needs    Home Medication Changes: as below   All discharge medications have been reconciled with current medication list.     Med list     Medication List        START taking these medications      amLODIPine 5 MG Tabs  Commonly known as: Norvasc  Take 1 tablet (5 mg total) by mouth daily.  Start taking on: January 17, 2024     atorvastatin 80 MG Tabs  Commonly known as: Lipitor  Take 1 tablet (80 mg total) by mouth nightly.     clopidogrel 75 MG Tabs  Commonly known as: Plavix  Take 1 tablet (75 mg total) by mouth daily.  Start taking on: January 17, 2024     gabapentin 300 MG Caps  Commonly known as: Neurontin  Take 1 capsule (300 mg total) by mouth 3 (three) times daily.     pantoprazole 40 MG Tbec  Commonly known as: Protonix  Take 1 tablet (40 mg total) by mouth every morning before breakfast.  Start taking on: January 17, 2024     vancomycin 125 MG Caps  Commonly known as: Vancocin  Take 1 capsule (125 mg total) by mouth 4 (four) times daily for 13 days.            CONTINUE taking these medications      enalapril 10 MG Tabs  Commonly known as:  Vasotec     folic acid 1 MG Tabs  Commonly known as: Folvite               Where to Get Your Medications        These medications were sent to [id number] - [place], [place] - [address] AT [address] [phone], [phone] [address], [facility] [address]      Phone: [phone]   amLODIPine 5 MG Tabs  atorvastatin 80 MG Tabs  clopidogrel 75 MG Tabs  gabapentin 300 MG Caps  pantoprazole 40 MG Tbec  vancomycin 125 MG Caps         FU   Follow-up Information       Beau Neville, DO Follow up in 1 month(s).    Specialty: NEUROLOGY  Why: Neuro/Stroke follow up  Contact information:  [address] [facility] [address]  [facility] [id number] [phone]               Jorge Pena, DO Follow up in 2 week(s).    Specialty: Cardiovascular Diseases  Why: stress test  Contact information:  [address]   [facility] [address]  [facility] [address] [phone]               Perfecto Joseph Follow up in 1 week(s).    Specialty: Geriatrics  Contact information:  [address] [facility] [id number] [phone]                             DC instructions:      Other Discharge Instructions:         Keep all follow up appointments and continue current medications.         Patient had opportunity to ask questions and state understand and agree with therapeutic plan as outlined    Thank You,    Gabino Coon M.D.  [facility] Hospitalist

## 2024-01-16 NOTE — PROGRESS NOTES
Patient seen for follow-up visit.  No further episode of diplopia, dysarthria, weakness, numbness, paresthesias.  No further episode of rectal bleed.  Denies confusion, disorientation or loss of consciousness.    Examination:  Awake, alert, oriented x 3.  No dysarthria or aphasia.  Pupils round reactive light, extraocular movements intact, no facial weakness, tongue midline.  Symmetrical bulk, tone and power in upper and lower extremities.    Educations: MRI of the brain:  MRI of brain . No acute abnormality noted  CTA of the head and neck was unremarkable.    Echocardiogram did not show any intracardiac thrombi or shunting.  LDL was 72  HbA1c was 4.5.      Impression/MDM:    TIA.  Patient started on Plavix and statin.  Episode of lower GI bleed.  Patient not given aspirin.  Follow-up with GI for further evaluation.  Mild thrombocytopenia.  PCP to monitor closely.  Follow-up with neurology in 4 weeks.  Counseled.  All questions answered.

## 2024-01-16 NOTE — PAYOR COMM NOTE
--------------  DISCHARGE REVIEW    Payor: [facility]  Subscriber #:  [id number]  Authorization Number: [id number]    Admit date: 1/14/24  Admit time:   8:05 PM  Discharge Date: 1/16/2024  2:50 PM     Admitting Physician: Gabino Coon MD  Attending Physician:  No att. providers found  Primary Care Physician: Scott Albrecht          Discharge Summary Notes        Discharge Summary signed by Gabino Coon MD at 1/16/2024  3:32 PM       Author: Gabino Coon MD Specialty: HOSPITALIST Author Type: Physician    Filed: 1/16/2024  3:32 PM Date of Service: 1/16/2024  1:21 PM Status: Signed    : Gabino Coon MD (Physician)           General Medicine Discharge Summary     Patient ID:  Donell Redmond  48 year old  2/24/1975    Admit date: 1/14/2024    Discharge date and time: 1/16/24    Attending Physician: Gabino Coon MD     Consults: IP CONSULT TO CARDIOLOGY  IP CONSULT TO NEUROLOGY  IP CONSULT TO SOCIAL WORK    Primary Care Physician: SCOTT ALBRECHT     Reason for admission:   CVA rule out  Right hand numbness and tingling    Risk For Readmission: low    Discharge Diagnoses: Cerebrovascular accident (CVA), unspecified mechanism (HCC) [I63.9]  See Additional Discharge Diagnoses in Hospital Course    Discharged Condition: stable    Follow-up with labs/images appointments: PCP, Neurology     Exam  General: Alert, no acute distress  HEENT: atraumatic, sclera anicteric, oral mucosa normal   Neck: non tender, no adenopathy   Lungs: clear to ausculation bilaterally, no wheezing  Heart: Regular rate and rhythm  Abdomen: soft, non tender, non distended   Extremities: No edema  Skin: no new rash, normal color  Neuro: 5/5 strength in bilateral extremities, normal sensations  Psych: appropriate affect     HPI: Patient is a 48 year old male with PMH sig for HTN not on any medications, who presents with right hand numbness and change in speech.  Symptoms started yesterday after he woke up from the nap in the late  afternoon.  He had never felt symptoms like this in the past and he was asymptomatic before he fell asleep.  He spoke to a neighbor who thought he was having a stroke and the neighbor felt that there was some change in the patient's speech.  Patient denies any headache or dizziness, loss of consciousness, loss of bowel or bladder control.  States he has no chest pain, shortness of breath, abdominal pain, nausea or vomiting.  Patient is having some loose stools and feels there may have been some blood in his stools.  Patient works as a  and thinks he has hemorrhoids.     Hospital Course:   Patient is a 48 year old male with PMH sig for HTN not on any medications, who presents with right hand numbness and change in speech.  CT head, CTA, MRI brain with no signs of acute neurological events.  Will start on Plavix and statin.  Patient also having GI distress with diarrhea with some blood in stools.  Found to have positive C. difficile with resolution of diarrhea and blood.  Will continue Plavix on discharge.  Statin.  Will plan to finish 14-day course of oral vancomycin.  Unclear as to cause of right arm paresthesia but seen improvement with low-dose gabapentin.  Will plan to follow-up with neurology and PCP in the next 1 to 2 weeks.  Will resume home enalapril and add amlodipine on discharge.    CVA rule out  Right hand numbness and tingling  Change in speech?  -CT head, CTA head and neck, MRI brain with no signs of acute event  -Neurology consulted, stroke protocol  -Echo reviewed, speech, PT/OT  -Statin, dual antiplatelet therapy initiated  -Gabapentin added for neuropathic pain     Troponin elevation  -Likely due to demand ischemia  -CTA chest with no pulmonary embolism or dissection  -Seen by cardiology with no need for heparin drip at this time  -Will continue with plavix      HTN  -Not on home medications  -Will start with amlodipine  -resume home enalapril      Distend esophagus  Transaminitis  -Seen  on CT with fluid concerning for dysmotility, abdominal distention  -No evidence of obstruction  -Recent diarrhea with possible blood  - with possible hemorrhoids  -PPI  -Continue plavix   -Stool studies with C. difficile, potential cause for bleeding  -Start oral vancomycin for 14 days      YANG resolved   -Likely prerenal  -Resolved with IV fluids     Leukocytosis resolved   -Likely reactive due to above, improving with IV fluids    Operative Procedures:      Imaging: XR ABDOMEN OBSTRUCTIVE SERIES ROUTINE(2 VW)(CPT=74019)    Result Date: 1/15/2024  CONCLUSION:  1. No acute finding.    Dictated by (CST): Toney Puri MD on 1/15/2024 at 4:37 PM     Finalized by (CST): Toney Puri MD on 1/15/2024 at 4:38 PM          MRI BRAIN WO ACUTE (3) SEQUENCE (CPT=70551)    Result Date: 1/15/2024  CONCLUSION:   No acute intracranial abnormality.  Small chronic right lamina papyracea fracture with herniation of the right medial extraconal fat towards the fracture defect.  Lesser incidental findings described above.    Dictated by (CST): Ariel Cervantes MD on 1/15/2024 at 6:11 AM     Finalized by (CST): Ariel Cevrantes MD on 1/15/2024 at 6:16 AM          CTA CHEST+CTA ABDOMEN DISSECT SET (CPT=71275/95164)    Result Date: 1/14/2024  CONCLUSION:  1.  No acute pulmonary embolus to the subsegmental pulmonary artery level.  No acute aortic injury; no dissection.  CTA imaging of the aorta and its major branch vessels shows patency without high-grade stenosis or occlusion.  No dissection. 2.  Cardiomegaly.  Pulmonary artery enlargement.  Findings suggest pulmonary artery hypertension. 3.  No aortic aneurysm or dissection involving the thoracic or abdominal aorta. 4.  Esophagus is distended with a large column of fluid.  This could represent changes of esophageal dysmotility, gastroesophageal reflux, recent emesis, or gastroesophageal junction stricture/achalasia.  Stomach is also distended with fluid without convincing  evidence for gastric obstruction. 5.  Colonic diverticulosis. 6.  Periampullary duodenal diverticulum.  No biliary ductal dilatation.    Dictated by (CST): Landen Bob MD on 1/14/2024 at 5:38 PM     Finalized by (CST): Landen Bob MD on 1/14/2024 at 5:43 PM          XR CHEST AP PORTABLE  (CPT=71045)    Result Date: 1/14/2024  CONCLUSION: No acute cardiopulmonary abnormality.  Stable mild cardiomegaly.   Dictated by (CST): Landen Bob MD on 1/14/2024 at 5:36 PM     Finalized by (CST): Landen Bob MD on 1/14/2024 at 5:37 PM          CT STROKE CTA BRAIN/CTA NECK (W IV)(CPT=70496/65994)    Result Date: 1/14/2024  CONCLUSION:  1. CTA head:  Hypoplastic right A1 anterior cerebral artery segment, normal anatomic variant.  Otherwise, no significant stenosis, occlusion, or gross aneurysm in the anterior or posterior circulation. 2. CTA neck: No significant stenosis, occlusion, or dissection of the carotids or vertebrals.   Exam discussed with Dr. Reyes on 1/14/24 at 5:05 p.m.  Dictated by (CST): Landen Bob MD on 1/14/2024 at 5:07 PM     Finalized by (CST): Landen Bob MD on 1/14/2024 at 5:09 PM          CT STROKE BRAIN (NO IV)(CPT=70450)    Result Date: 1/14/2024  CONCLUSION: No acute intracranial process.   Exam discussed with Dr. Reyes on 1/14/24 at 5:05 p.m.   Dictated by (CST): Landen Bob MD on 1/14/2024 at 4:57 PM     Finalized by (CST): Landen Bob MD on 1/14/2024 at 5:06 PM             Disposition: home    Activity: as tolerated   Diet: general   Wound Care: no needs  Code Status: Full Code  O2: no needs    Home Medication Changes: as below   All discharge medications have been reconciled with current medication list.     Med list     Medication List        START taking these medications      amLODIPine 5 MG Tabs  Commonly known as: Norvasc  Take 1 tablet (5 mg total) by mouth daily.  Start taking on: January 17, 2024     atorvastatin 80 MG Tabs  Commonly known as: Lipitor  Take 1 tablet (80 mg total) by mouth  nightly.     clopidogrel 75 MG Tabs  Commonly known as: Plavix  Take 1 tablet (75 mg total) by mouth daily.  Start taking on: January 17, 2024     gabapentin 300 MG Caps  Commonly known as: Neurontin  Take 1 capsule (300 mg total) by mouth 3 (three) times daily.     pantoprazole 40 MG Tbec  Commonly known as: Protonix  Take 1 tablet (40 mg total) by mouth every morning before breakfast.  Start taking on: January 17, 2024     vancomycin 125 MG Caps  Commonly known as: Vancocin  Take 1 capsule (125 mg total) by mouth 4 (four) times daily for 13 days.            CONTINUE taking these medications      enalapril 10 MG Tabs  Commonly known as: Vasotec     folic acid 1 MG Tabs  Commonly known as: Folvite               Where to Get Your Medications        These medications were sent to [id number] - [place], [place] - [address] AT SEC OF [address] [place] [address]. [address] [phone], [phone] [address], [facility] [address]      Phone: [phone]   amLODIPine 5 MG Tabs  atorvastatin 80 MG Tabs  clopidogrel 75 MG Tabs  gabapentin 300 MG Caps  pantoprazole 40 MG Tbec  vancomycin 125 MG Caps         FU   Follow-up Information       Beau Neville, DO Follow up in 1 month(s).    Specialty: NEUROLOGY  Why: Neuro/Stroke follow up  Contact information:  [address] [facility] [address]  [facility] [id number] [phone]               Jorge Pena, DO Follow up in 2 week(s).    Specialty: Cardiovascular Diseases  Why: stress test  Contact information:  [address]   [facility] [address]  [facility] [address]  [phone]               Perfecto Joseph Follow up in 1 week(s).    Specialty: Geriatrics  Contact information:  [address] [facility] [id number] [phone]                             DC instructions:      Other Discharge Instructions:         Keep all follow up appointments and continue current medications.         Patient had opportunity to ask questions and state understand and  agree with therapeutic plan as outlined    Thank You,    Gabino Coon M.D.  [facility]      Electronically signed by Gabino Coon MD on 1/16/2024  3:32 PM         REVIEWER COMMENTS

## 2024-02-11 ENCOUNTER — APPOINTMENT (OUTPATIENT)
Dept: ULTRASOUND IMAGING | Facility: HOSPITAL | Age: 49
End: 2024-02-11
Payer: MEDICAID

## 2024-02-11 ENCOUNTER — HOSPITAL ENCOUNTER (EMERGENCY)
Facility: HOSPITAL | Age: 49
Discharge: HOME | End: 2024-02-11
Attending: EMERGENCY MEDICINE
Payer: MEDICAID

## 2024-02-11 ENCOUNTER — APPOINTMENT (OUTPATIENT)
Dept: GENERAL RADIOLOGY | Facility: HOSPITAL | Age: 49
End: 2024-02-11
Attending: EMERGENCY MEDICINE
Payer: MEDICAID

## 2024-02-11 VITALS
BODY MASS INDEX: 30.82 KG/M2 | OXYGEN SATURATION: 95 % | HEIGHT: 65 IN | SYSTOLIC BLOOD PRESSURE: 154 MMHG | WEIGHT: 185 LBS | TEMPERATURE: 98 F | HEART RATE: 93 BPM | RESPIRATION RATE: 19 BRPM | DIASTOLIC BLOOD PRESSURE: 93 MMHG

## 2024-02-11 DIAGNOSIS — L03.116 CELLULITIS OF LEFT KNEE: ICD-10-CM

## 2024-02-11 DIAGNOSIS — M70.52 SUPRAPATELLAR BURSITIS OF LEFT KNEE: Primary | ICD-10-CM

## 2024-02-11 LAB
ANION GAP SERPL CALC-SCNC: 5 MMOL/L (ref 0–18)
BASOPHILS # BLD AUTO: 0.01 X10(3) UL (ref 0–0.2)
BASOPHILS NFR BLD AUTO: 0.1 %
BUN BLD-MCNC: 10 MG/DL (ref 9–23)
BUN/CREAT SERPL: 10.2 (ref 10–20)
CALCIUM BLD-MCNC: 9.4 MG/DL (ref 8.7–10.4)
CHLORIDE SERPL-SCNC: 102 MMOL/L (ref 98–112)
CO2 SERPL-SCNC: 32 MMOL/L (ref 21–32)
CREAT BLD-MCNC: 0.98 MG/DL
DEPRECATED RDW RBC AUTO: 39.7 FL (ref 35.1–46.3)
EGFRCR SERPLBLD CKD-EPI 2021: 95 ML/MIN/1.73M2 (ref 60–?)
EOSINOPHIL # BLD AUTO: 0.03 X10(3) UL (ref 0–0.7)
EOSINOPHIL NFR BLD AUTO: 0.4 %
ERYTHROCYTE [DISTWIDTH] IN BLOOD BY AUTOMATED COUNT: 11.3 % (ref 11–15)
GLUCOSE BLD-MCNC: 124 MG/DL (ref 70–99)
HCT VFR BLD AUTO: 31.3 %
HGB BLD-MCNC: 10.6 G/DL
IMM GRANULOCYTES # BLD AUTO: 0.01 X10(3) UL (ref 0–1)
IMM GRANULOCYTES NFR BLD: 0.1 %
LYMPHOCYTES # BLD AUTO: 0.82 X10(3) UL (ref 1–4)
LYMPHOCYTES NFR BLD AUTO: 11.8 %
MCH RBC QN AUTO: 32 PG (ref 26–34)
MCHC RBC AUTO-ENTMCNC: 33.9 G/DL (ref 31–37)
MCV RBC AUTO: 94.6 FL
MONOCYTES # BLD AUTO: 0.63 X10(3) UL (ref 0.1–1)
MONOCYTES NFR BLD AUTO: 9.1 %
NEUTROPHILS # BLD AUTO: 5.46 X10 (3) UL (ref 1.5–7.7)
NEUTROPHILS # BLD AUTO: 5.46 X10(3) UL (ref 1.5–7.7)
NEUTROPHILS NFR BLD AUTO: 78.5 %
OSMOLALITY SERPL CALC.SUM OF ELEC: 288 MOSM/KG (ref 275–295)
PLATELET # BLD AUTO: 190 10(3)UL (ref 150–450)
POTASSIUM SERPL-SCNC: 3.7 MMOL/L (ref 3.5–5.1)
RBC # BLD AUTO: 3.31 X10(6)UL
SODIUM SERPL-SCNC: 139 MMOL/L (ref 136–145)
URATE SERPL-MCNC: 4.5 MG/DL
WBC # BLD AUTO: 7 X10(3) UL (ref 4–11)

## 2024-02-11 PROCEDURE — 93971 EXTREMITY STUDY: CPT | Performed by: EMERGENCY MEDICINE

## 2024-02-11 PROCEDURE — 84550 ASSAY OF BLOOD/URIC ACID: CPT | Performed by: EMERGENCY MEDICINE

## 2024-02-11 PROCEDURE — 99285 EMERGENCY DEPT VISIT HI MDM: CPT

## 2024-02-11 PROCEDURE — 36415 COLL VENOUS BLD VENIPUNCTURE: CPT

## 2024-02-11 PROCEDURE — 73562 X-RAY EXAM OF KNEE 3: CPT | Performed by: EMERGENCY MEDICINE

## 2024-02-11 PROCEDURE — 80048 BASIC METABOLIC PNL TOTAL CA: CPT | Performed by: EMERGENCY MEDICINE

## 2024-02-11 PROCEDURE — 85025 COMPLETE CBC W/AUTO DIFF WBC: CPT | Performed by: EMERGENCY MEDICINE

## 2024-02-11 PROCEDURE — 99284 EMERGENCY DEPT VISIT MOD MDM: CPT

## 2024-02-11 RX ORDER — SULFAMETHOXAZOLE AND TRIMETHOPRIM 800; 160 MG/1; MG/1
1 TABLET ORAL 2 TIMES DAILY
Qty: 14 TABLET | Refills: 0 | Status: SHIPPED | OUTPATIENT
Start: 2024-02-11 | End: 2024-02-18
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
## 2024-02-11 NOTE — ED INITIAL ASSESSMENT (HPI)
Patient arrives with reports of L knee pain. + Swelling/warm. Patient states swelling/pain started 2 days ago. Denies SOB, CP.

## 2024-04-08 ENCOUNTER — HOSPITAL ENCOUNTER (EMERGENCY)
Facility: HOSPITAL | Age: 49
Discharge: LEFT BEFORE DISCHARGE | End: 2024-04-08
Payer: MEDICAID

## 2024-04-08 VITALS
OXYGEN SATURATION: 97 % | TEMPERATURE: 98 F | HEART RATE: 108 BPM | WEIGHT: 178 LBS | DIASTOLIC BLOOD PRESSURE: 75 MMHG | BODY MASS INDEX: 30 KG/M2 | SYSTOLIC BLOOD PRESSURE: 138 MMHG | RESPIRATION RATE: 18 BRPM

## 2024-04-08 NOTE — ED INITIAL ASSESSMENT (HPI)
Patient complains of 4th and 5th digit right hand numbness x 9 days while doing pushups. History of \"slight stroke\". No other neuro deficits noted.

## 2024-04-08 NOTE — ED QUICK NOTES
Rn called patient x 2 including triage staff without success, called cellphone, patient did not answer. Considered lwbs

## 2024-04-22 ENCOUNTER — HOSPITAL ENCOUNTER (EMERGENCY)
Facility: HOSPITAL | Age: 49
Discharge: HOME | End: 2024-04-22
Attending: EMERGENCY MEDICINE
Payer: MEDICAID

## 2024-04-22 VITALS
SYSTOLIC BLOOD PRESSURE: 149 MMHG | HEART RATE: 112 BPM | DIASTOLIC BLOOD PRESSURE: 81 MMHG | TEMPERATURE: 97 F | OXYGEN SATURATION: 98 % | WEIGHT: 180 LBS | BODY MASS INDEX: 29.99 KG/M2 | RESPIRATION RATE: 18 BRPM | HEIGHT: 65 IN

## 2024-04-22 DIAGNOSIS — G56.22 ULNAR NEUROPATHY OF LEFT UPPER EXTREMITY: Primary | ICD-10-CM

## 2024-04-22 PROCEDURE — 99284 EMERGENCY DEPT VISIT MOD MDM: CPT

## 2024-04-22 PROCEDURE — 99283 EMERGENCY DEPT VISIT LOW MDM: CPT

## 2024-04-22 RX ORDER — METHYLPREDNISOLONE 4 MG/1
TABLET ORAL
Qty: 1 EACH | Refills: 0 | Status: SHIPPED | OUTPATIENT
Start: 2024-04-22

## 2024-04-22 NOTE — ED PROVIDER NOTES
Patient Seen in: [facility] Emergency Department      History     Chief Complaint   Patient presents with    Numbness     Stated Complaint: Numbness of right hand x2.5 weeks.    Subjective:   HPI    Patient is a 49-year-old right-handed male who presents with right fourth and fifth finger numbness x 2 weeks.  He states it started after doing assisted push-ups on the grounds and he felt like he pinched something in his hand.  He states he had similar symptoms in January and was diagnosed with a TIA.  Currently not on anticoagulation due to GI bleed at that time as well.  He states that that time he also had slurred speech and weakness and does not have that at this time.  No facial droop or headaches.    Objective:   No pertinent past medical history.            No pertinent past surgical history.              No pertinent social history.            Review of Systems    Positive for stated complaint: Numbness of right hand x2.5 weeks.  Other systems are as noted in HPI.  Constitutional and vital signs reviewed.      All other systems reviewed and negative except as noted above.    Physical Exam     ED Triage Vitals [04/22/24 0322]   /81   Pulse 112   Resp 18   Temp 97.3 °F (36.3 °C)   Temp src Temporal   SpO2 98 %   O2 Device None (Room air)       Current:/81   Pulse 112   Temp 97.3 °F (36.3 °C) (Temporal)   Resp 18   Ht 165.1 cm (5' 5\")   Wt 81.6 kg   SpO2 98%   BMI 29.95 kg/m²         Physical Exam    GENERAL: No acute distress, awake and alert  HEENT: EOMI, PERRL  Neck: supple  Extremities: FROM of all extremities, 4th/5th finger flexed slightly in \"claw hand\". No swelling or tenderness. Compartments soft  Neuro: CN intact, normal speech, normal gait, normal sensation, normal hand grasp, normal finger to nose  SKIN: warm, dry, no rashes      ED Course   Labs Reviewed - No data to display    MDM         Medical Decision Making  Exam c/w claw hand or ulnar nerve palsy.  Neurologic exam  otherwise intact.  Recommend splint, steroids, neurology f/u.   Advised on return precautions    Amount and/or Complexity of Data Reviewed  External Data Reviewed: labs and radiology.     Details: Labs, mri results 2024 reviewed. Neuro notes 2024 reviewed    Risk  Prescription drug management.        Disposition and Plan     Clinical Impression:  1. Ulnar neuropathy of left upper extremity         Disposition:  Discharge  4/22/2024  3:58 am    Follow-up:  Beau Neville DO  [address] [address]  [phone]    Schedule an appointment as soon as possible for a visit            Medications Prescribed:  Current Discharge Medication List        START taking these medications    Details   methylPREDNISolone (MEDROL) 4 MG Oral Tablet Therapy Pack Dosepack: take as directed  Qty: 1 each, Refills: 0

## 2024-04-22 NOTE — ED INITIAL ASSESSMENT (HPI)
Pt to ED with c/o numbness to 4th and 5th digit of right hand after doing push ups 2.5 weeks ago. Pt able to make a fist without difficulty.

## (undated) NOTE — ED AVS SNAPSHOT
Bijan Merrill   MRN: [id number]    Department:  [facility] Emergency Department   Date of Visit:  12/14/2019           Disclosure     Insurance plans vary and the physician(s) referred by the ER may not be covered by your plan.  Please contact CARE PHYSICIAN AT ONCE OR RETURN IMMEDIATELY TO THE EMERGENCY DEPARTMENT. If you have been prescribed any medication(s), please fill your prescription right away and begin taking the medication(s) as directed.   If you believe that any of the medications